# Patient Record
Sex: FEMALE | Race: WHITE | HISPANIC OR LATINO | Employment: OTHER | URBAN - METROPOLITAN AREA
[De-identification: names, ages, dates, MRNs, and addresses within clinical notes are randomized per-mention and may not be internally consistent; named-entity substitution may affect disease eponyms.]

---

## 2023-03-08 LAB
ERYTHROCYTE DISTRIBUTION WIDTH (RATIO) BY AUTOMATED COUNT: 14.1 % (ref 11.5–14.5)
ERYTHROCYTE MEAN CORPUSCULAR HEMOGLOBIN CONCENTRATION (G/DL) BY AUTOMATED: 31.7 G/DL (ref 32–36)
ERYTHROCYTE MEAN CORPUSCULAR VOLUME (FL) BY AUTOMATED COUNT: 89 FL (ref 80–100)
ERYTHROCYTES (10*6/UL) IN BLOOD BY AUTOMATED COUNT: 3.98 X10E12/L (ref 4–5.2)
HEMATOCRIT (%) IN BLOOD BY AUTOMATED COUNT: 35.3 % (ref 36–46)
HEMOGLOBIN (G/DL) IN BLOOD: 11.2 G/DL (ref 12–16)
LEUKOCYTES (10*3/UL) IN BLOOD BY AUTOMATED COUNT: 6.7 X10E9/L (ref 4.4–11.3)
NRBC (PER 100 WBCS) BY AUTOMATED COUNT: 0 /100 WBC (ref 0–0)
PLATELETS (10*3/UL) IN BLOOD AUTOMATED COUNT: 179 X10E9/L (ref 150–450)

## 2023-03-15 LAB
COBALAMIN (VITAMIN B12) (PG/ML) IN SER/PLAS: 507 PG/ML (ref 211–911)
FERRITIN (UG/LL) IN SER/PLAS: 73 UG/L (ref 8–150)
IRON (UG/DL) IN SER/PLAS: 67 UG/DL (ref 35–150)
IRON BINDING CAPACITY (UG/DL) IN SER/PLAS: 304 UG/DL (ref 240–445)
IRON SATURATION (%) IN SER/PLAS: 22 % (ref 25–45)

## 2023-04-07 LAB
ANION GAP IN SER/PLAS: 12 MMOL/L (ref 10–20)
CALCIUM (MG/DL) IN SER/PLAS: 9 MG/DL (ref 8.6–10.6)
CARBON DIOXIDE, TOTAL (MMOL/L) IN SER/PLAS: 27 MMOL/L (ref 21–32)
CHLORIDE (MMOL/L) IN SER/PLAS: 105 MMOL/L (ref 98–107)
CHOLESTEROL IN LDL (MG/DL) IN SER/PLAS BY DIRECT ASSAY: 61 MG/DL (ref 0–129)
CREATININE (MG/DL) IN SER/PLAS: 0.73 MG/DL (ref 0.5–1.05)
ERYTHROCYTE DISTRIBUTION WIDTH (RATIO) BY AUTOMATED COUNT: 14.1 % (ref 11.5–14.5)
ERYTHROCYTE MEAN CORPUSCULAR HEMOGLOBIN CONCENTRATION (G/DL) BY AUTOMATED: 32 G/DL (ref 32–36)
ERYTHROCYTE MEAN CORPUSCULAR VOLUME (FL) BY AUTOMATED COUNT: 88 FL (ref 80–100)
ERYTHROCYTES (10*6/UL) IN BLOOD BY AUTOMATED COUNT: 4.14 X10E12/L (ref 4–5.2)
GFR FEMALE: 82 ML/MIN/1.73M2
GLUCOSE (MG/DL) IN SER/PLAS: 81 MG/DL (ref 74–99)
HEMATOCRIT (%) IN BLOOD BY AUTOMATED COUNT: 36.6 % (ref 36–46)
HEMOGLOBIN (G/DL) IN BLOOD: 11.7 G/DL (ref 12–16)
LEUKOCYTES (10*3/UL) IN BLOOD BY AUTOMATED COUNT: 6.9 X10E9/L (ref 4.4–11.3)
NRBC (PER 100 WBCS) BY AUTOMATED COUNT: 0 /100 WBC (ref 0–0)
PLATELETS (10*3/UL) IN BLOOD AUTOMATED COUNT: 186 X10E9/L (ref 150–450)
POTASSIUM (MMOL/L) IN SER/PLAS: 4.4 MMOL/L (ref 3.5–5.3)
SODIUM (MMOL/L) IN SER/PLAS: 140 MMOL/L (ref 136–145)
UREA NITROGEN (MG/DL) IN SER/PLAS: 22 MG/DL (ref 6–23)

## 2023-06-12 LAB
ANION GAP IN SER/PLAS: 14 MMOL/L (ref 10–20)
CALCIUM (MG/DL) IN SER/PLAS: 9.5 MG/DL (ref 8.6–10.6)
CARBON DIOXIDE, TOTAL (MMOL/L) IN SER/PLAS: 28 MMOL/L (ref 21–32)
CHLORIDE (MMOL/L) IN SER/PLAS: 106 MMOL/L (ref 98–107)
CREATININE (MG/DL) IN SER/PLAS: 0.69 MG/DL (ref 0.5–1.05)
GFR FEMALE: 87 ML/MIN/1.73M2
GLUCOSE (MG/DL) IN SER/PLAS: 88 MG/DL (ref 74–99)
NATRIURETIC PEPTIDE B (PG/ML) IN SER/PLAS: 243 PG/ML (ref 0–99)
POTASSIUM (MMOL/L) IN SER/PLAS: 4.7 MMOL/L (ref 3.5–5.3)
SODIUM (MMOL/L) IN SER/PLAS: 143 MMOL/L (ref 136–145)
UREA NITROGEN (MG/DL) IN SER/PLAS: 23 MG/DL (ref 6–23)

## 2023-09-02 PROBLEM — R09.89 BRUIT OF RIGHT CAROTID ARTERY: Status: ACTIVE | Noted: 2023-09-02

## 2023-09-02 PROBLEM — E78.5 HYPERLIPIDEMIA: Status: ACTIVE | Noted: 2023-09-02

## 2023-09-02 PROBLEM — D64.9 MILD ANEMIA: Status: ACTIVE | Noted: 2023-09-02

## 2023-09-02 PROBLEM — R60.9 EDEMA: Status: ACTIVE | Noted: 2023-09-02

## 2023-09-02 PROBLEM — M54.31 SCIATICA OF RIGHT SIDE: Status: ACTIVE | Noted: 2023-09-02

## 2023-09-02 PROBLEM — R01.1 HEART MURMUR: Status: ACTIVE | Noted: 2023-09-02

## 2023-09-02 PROBLEM — R27.0 ATAXIA: Status: ACTIVE | Noted: 2023-09-02

## 2023-09-02 PROBLEM — L72.3 SEBACEOUS CYST: Status: ACTIVE | Noted: 2023-09-02

## 2023-09-02 PROBLEM — M17.9 OA (OSTEOARTHRITIS) OF KNEE: Status: ACTIVE | Noted: 2023-09-02

## 2023-09-02 PROBLEM — L24.A9 DRAINAGE FROM WOUND: Status: ACTIVE | Noted: 2023-09-02

## 2023-09-02 PROBLEM — Z85.42 HISTORY OF CANCER OF UTERUS: Status: ACTIVE | Noted: 2023-09-02

## 2023-09-02 PROBLEM — J44.9 COPD (CHRONIC OBSTRUCTIVE PULMONARY DISEASE) (MULTI): Status: ACTIVE | Noted: 2023-09-02

## 2023-09-02 PROBLEM — R93.89 ABNORMAL MRI: Status: ACTIVE | Noted: 2023-09-02

## 2023-09-02 PROBLEM — H81.10 BENIGN PAROXYSMAL POSITIONAL VERTIGO: Status: ACTIVE | Noted: 2023-09-02

## 2023-09-02 PROBLEM — I49.9 IRREGULAR HEARTBEAT: Status: ACTIVE | Noted: 2023-09-02

## 2023-09-02 PROBLEM — M20.12 ACQUIRED HALLUX VALGUS OF LEFT FOOT: Status: ACTIVE | Noted: 2023-09-02

## 2023-09-02 PROBLEM — K62.5 RECTAL BLEEDING: Status: ACTIVE | Noted: 2023-09-02

## 2023-09-02 PROBLEM — M20.31 ACQUIRED HALLUX VARUS OF RIGHT FOOT: Status: ACTIVE | Noted: 2023-09-02

## 2023-09-02 PROBLEM — E04.2 NONTOXIC MULTINODULAR GOITER: Status: ACTIVE | Noted: 2023-09-02

## 2023-09-02 PROBLEM — G89.29 CHRONIC BILATERAL LOW BACK PAIN WITHOUT SCIATICA: Status: ACTIVE | Noted: 2023-09-02

## 2023-09-02 PROBLEM — M54.50 CHRONIC BILATERAL LOW BACK PAIN WITHOUT SCIATICA: Status: ACTIVE | Noted: 2023-09-02

## 2023-09-02 PROBLEM — I10 HYPERTENSION: Status: ACTIVE | Noted: 2023-09-02

## 2023-09-02 PROBLEM — R73.01 IFG (IMPAIRED FASTING GLUCOSE): Status: ACTIVE | Noted: 2023-09-02

## 2023-09-02 PROBLEM — L84 CALLUS: Status: ACTIVE | Noted: 2023-09-02

## 2023-09-02 PROBLEM — L02.413 ABSCESS OF RIGHT SHOULDER: Status: ACTIVE | Noted: 2023-09-02

## 2023-09-02 PROBLEM — M79.675 CHRONIC TOE PAIN, LEFT FOOT: Status: ACTIVE | Noted: 2023-09-02

## 2023-09-02 PROBLEM — M48.062 SPINAL STENOSIS OF LUMBAR REGION WITH NEUROGENIC CLAUDICATION: Status: ACTIVE | Noted: 2023-09-02

## 2023-09-02 PROBLEM — E55.9 VITAMIN D DEFICIENCY: Status: ACTIVE | Noted: 2023-09-02

## 2023-09-02 PROBLEM — I10 BENIGN ESSENTIAL HYPERTENSION: Status: ACTIVE | Noted: 2023-09-02

## 2023-09-02 PROBLEM — E66.9 CLASS 1 OBESITY WITH BODY MASS INDEX (BMI) OF 33.0 TO 33.9 IN ADULT: Status: ACTIVE | Noted: 2023-09-02

## 2023-09-02 PROBLEM — M20.5X9 ACQUIRED CLAW TOE: Status: ACTIVE | Noted: 2023-09-02

## 2023-09-02 PROBLEM — E66.811 CLASS 1 OBESITY WITH BODY MASS INDEX (BMI) OF 33.0 TO 33.9 IN ADULT: Status: ACTIVE | Noted: 2023-09-02

## 2023-09-02 PROBLEM — H35.30 AGE-RELATED MACULAR DEGENERATION: Status: ACTIVE | Noted: 2023-09-02

## 2023-09-02 PROBLEM — M20.42 HAMMER TOE OF LEFT FOOT: Status: ACTIVE | Noted: 2023-09-02

## 2023-09-02 PROBLEM — I48.0 PAROXYSMAL ATRIAL FIBRILLATION (MULTI): Status: ACTIVE | Noted: 2023-09-02

## 2023-09-02 PROBLEM — E04.1 SOLITARY THYROID NODULE: Status: ACTIVE | Noted: 2023-09-02

## 2023-09-02 PROBLEM — I50.9 CONGESTIVE HEART FAILURE (CHF) (MULTI): Status: ACTIVE | Noted: 2023-09-02

## 2023-09-02 PROBLEM — G47.33 OBSTRUCTIVE SLEEP APNEA OF ADULT: Status: ACTIVE | Noted: 2023-09-02

## 2023-09-02 PROBLEM — G89.29 CHRONIC TOE PAIN, LEFT FOOT: Status: ACTIVE | Noted: 2023-09-02

## 2023-09-02 RX ORDER — METOPROLOL SUCCINATE 50 MG/1
1 TABLET, EXTENDED RELEASE ORAL DAILY
COMMUNITY

## 2023-09-02 RX ORDER — POTASSIUM CHLORIDE 20 MEQ/1
1 TABLET, EXTENDED RELEASE ORAL DAILY
COMMUNITY
Start: 2022-10-06

## 2023-09-02 RX ORDER — FUROSEMIDE 40 MG/1
1 TABLET ORAL
COMMUNITY
Start: 2018-01-16

## 2023-09-02 RX ORDER — ACETAMINOPHEN 500 MG
TABLET ORAL
COMMUNITY

## 2023-09-02 RX ORDER — ATORVASTATIN CALCIUM 10 MG/1
1 TABLET, FILM COATED ORAL
COMMUNITY
Start: 2018-04-05

## 2023-09-02 RX ORDER — GUAIFENESIN 1200 MG/1
TABLET, EXTENDED RELEASE ORAL
COMMUNITY

## 2023-09-02 RX ORDER — AMLODIPINE BESYLATE 2.5 MG/1
1 TABLET ORAL DAILY
COMMUNITY
Start: 2022-10-06 | End: 2024-02-13 | Stop reason: SINTOL

## 2023-09-02 RX ORDER — BENAZEPRIL HYDROCHLORIDE 40 MG/1
1 TABLET ORAL DAILY
COMMUNITY
Start: 2017-09-15

## 2023-09-02 RX ORDER — HYDRALAZINE HYDROCHLORIDE 25 MG/1
1 TABLET, FILM COATED ORAL 2 TIMES DAILY
COMMUNITY
Start: 2022-10-06 | End: 2024-05-22 | Stop reason: WASHOUT

## 2023-10-10 ENCOUNTER — OFFICE VISIT (OUTPATIENT)
Dept: PRIMARY CARE | Facility: CLINIC | Age: 83
End: 2023-10-10
Payer: MEDICARE

## 2023-10-10 VITALS
TEMPERATURE: 96.6 F | OXYGEN SATURATION: 96 % | BODY MASS INDEX: 33.16 KG/M2 | SYSTOLIC BLOOD PRESSURE: 140 MMHG | WEIGHT: 164.46 LBS | HEART RATE: 60 BPM | HEIGHT: 59 IN | RESPIRATION RATE: 16 BRPM | DIASTOLIC BLOOD PRESSURE: 70 MMHG

## 2023-10-10 DIAGNOSIS — I48.0 PAROXYSMAL ATRIAL FIBRILLATION (MULTI): ICD-10-CM

## 2023-10-10 DIAGNOSIS — I10 PRIMARY HYPERTENSION: ICD-10-CM

## 2023-10-10 DIAGNOSIS — R60.0 LOCALIZED EDEMA: Primary | ICD-10-CM

## 2023-10-10 PROCEDURE — 90662 IIV NO PRSV INCREASED AG IM: CPT | Performed by: INTERNAL MEDICINE

## 2023-10-10 PROCEDURE — G0008 ADMIN INFLUENZA VIRUS VAC: HCPCS | Performed by: INTERNAL MEDICINE

## 2023-10-10 PROCEDURE — 3077F SYST BP >= 140 MM HG: CPT | Performed by: INTERNAL MEDICINE

## 2023-10-10 PROCEDURE — 1036F TOBACCO NON-USER: CPT | Performed by: INTERNAL MEDICINE

## 2023-10-10 PROCEDURE — 3078F DIAST BP <80 MM HG: CPT | Performed by: INTERNAL MEDICINE

## 2023-10-10 PROCEDURE — 1159F MED LIST DOCD IN RCRD: CPT | Performed by: INTERNAL MEDICINE

## 2023-10-10 PROCEDURE — 99214 OFFICE O/P EST MOD 30 MIN: CPT | Performed by: INTERNAL MEDICINE

## 2023-10-10 PROCEDURE — 1170F FXNL STATUS ASSESSED: CPT | Performed by: INTERNAL MEDICINE

## 2023-10-10 ASSESSMENT — ENCOUNTER SYMPTOMS
DEPRESSION: 0
LOSS OF SENSATION IN FEET: 0
OCCASIONAL FEELINGS OF UNSTEADINESS: 0

## 2023-10-10 ASSESSMENT — PATIENT HEALTH QUESTIONNAIRE - PHQ9
2. FEELING DOWN, DEPRESSED OR HOPELESS: NOT AT ALL
SUM OF ALL RESPONSES TO PHQ9 QUESTIONS 1 AND 2: 0
1. LITTLE INTEREST OR PLEASURE IN DOING THINGS: NOT AT ALL

## 2023-10-10 NOTE — PROGRESS NOTES
"Subjective   Patient ID: Jessica Leija is a 83 y.o. female who presents for Hypertension and Heart Problem.    HPI 84 yo w f for follow up      Review of Systems  CVS 3 episodes of afib called card med changes hydralazine adjusted  Resp seeing sleep med at [facility]    Objective   /70 (BP Location: Right arm, Patient Position: Sitting)   Pulse 60   Temp 35.9 °C (96.6 °F)   Resp 16   Ht 1.499 m (4' 11\")   Wt 74.6 kg (164 lb 7.4 oz)   SpO2 96%   BMI 33.22 kg/m²     Physical Exam    Lungs clear to auscultation  Heart regular rate and rhythm without gallop rub or murmur  Extremities 1+ edema    Assessment/Plan   Hypertension good control #2 A-fib currently in sinus rhythm with rate controlled anticoagulation #3 sleep apnea seeing sleep medicine #4 immunization high-dose flu shot today COVID booster recommended RSV recommended follow-up with me in February           "

## 2023-12-21 ASSESSMENT — ACTIVITIES OF DAILY LIVING (ADL)
BATHING: INDEPENDENT
TAKING_MEDICATION: INDEPENDENT
DOING_HOUSEWORK: INDEPENDENT
MANAGING_FINANCES: INDEPENDENT
DRESSING: INDEPENDENT
GROCERY_SHOPPING: INDEPENDENT

## 2023-12-21 ASSESSMENT — PATIENT HEALTH QUESTIONNAIRE - PHQ9
SUM OF ALL RESPONSES TO PHQ9 QUESTIONS 1 AND 2: 0
2. FEELING DOWN, DEPRESSED OR HOPELESS: NOT AT ALL
1. LITTLE INTEREST OR PLEASURE IN DOING THINGS: NOT AT ALL

## 2024-02-13 ENCOUNTER — OFFICE VISIT (OUTPATIENT)
Dept: PRIMARY CARE | Facility: CLINIC | Age: 84
End: 2024-02-13
Payer: MEDICARE

## 2024-02-13 VITALS
RESPIRATION RATE: 17 BRPM | WEIGHT: 158.5 LBS | DIASTOLIC BLOOD PRESSURE: 70 MMHG | BODY MASS INDEX: 31.95 KG/M2 | SYSTOLIC BLOOD PRESSURE: 140 MMHG | OXYGEN SATURATION: 98 % | HEART RATE: 70 BPM | HEIGHT: 59 IN | TEMPERATURE: 97 F

## 2024-02-13 DIAGNOSIS — E66.09 CLASS 1 OBESITY DUE TO EXCESS CALORIES WITH SERIOUS COMORBIDITY AND BODY MASS INDEX (BMI) OF 33.0 TO 33.9 IN ADULT: ICD-10-CM

## 2024-02-13 DIAGNOSIS — I48.0 PAROXYSMAL ATRIAL FIBRILLATION (MULTI): ICD-10-CM

## 2024-02-13 DIAGNOSIS — G47.33 OBSTRUCTIVE SLEEP APNEA OF ADULT: ICD-10-CM

## 2024-02-13 DIAGNOSIS — I27.20 PULMONARY HYPERTENSION (MULTI): ICD-10-CM

## 2024-02-13 DIAGNOSIS — I10 PRIMARY HYPERTENSION: ICD-10-CM

## 2024-02-13 DIAGNOSIS — R60.0 LOCALIZED EDEMA: Primary | ICD-10-CM

## 2024-02-13 DIAGNOSIS — I34.0 MITRAL VALVE INSUFFICIENCY, UNSPECIFIED ETIOLOGY: ICD-10-CM

## 2024-02-13 PROCEDURE — 1170F FXNL STATUS ASSESSED: CPT | Performed by: INTERNAL MEDICINE

## 2024-02-13 PROCEDURE — 3077F SYST BP >= 140 MM HG: CPT | Performed by: INTERNAL MEDICINE

## 2024-02-13 PROCEDURE — 1036F TOBACCO NON-USER: CPT | Performed by: INTERNAL MEDICINE

## 2024-02-13 PROCEDURE — 3078F DIAST BP <80 MM HG: CPT | Performed by: INTERNAL MEDICINE

## 2024-02-13 PROCEDURE — 1160F RVW MEDS BY RX/DR IN RCRD: CPT | Performed by: INTERNAL MEDICINE

## 2024-02-13 PROCEDURE — 1159F MED LIST DOCD IN RCRD: CPT | Performed by: INTERNAL MEDICINE

## 2024-02-13 PROCEDURE — 99214 OFFICE O/P EST MOD 30 MIN: CPT | Performed by: INTERNAL MEDICINE

## 2024-02-13 PROCEDURE — 1124F ACP DISCUSS-NO DSCNMKR DOCD: CPT | Performed by: INTERNAL MEDICINE

## 2024-02-13 PROCEDURE — G0439 PPPS, SUBSEQ VISIT: HCPCS | Performed by: INTERNAL MEDICINE

## 2024-02-13 ASSESSMENT — ENCOUNTER SYMPTOMS
ARTHRALGIAS: 1
PALPITATIONS: 0
SHORTNESS OF BREATH: 0
ACTIVITY CHANGE: 0
DYSPHORIC MOOD: 0
GASTROINTESTINAL NEGATIVE: 1

## 2024-02-13 ASSESSMENT — ACTIVITIES OF DAILY LIVING (ADL)
BATHING: INDEPENDENT
MANAGING_FINANCES: INDEPENDENT
DOING_HOUSEWORK: INDEPENDENT
GROCERY_SHOPPING: INDEPENDENT
DRESSING: INDEPENDENT
TAKING_MEDICATION: INDEPENDENT

## 2024-02-13 ASSESSMENT — PATIENT HEALTH QUESTIONNAIRE - PHQ9
1. LITTLE INTEREST OR PLEASURE IN DOING THINGS: NOT AT ALL
2. FEELING DOWN, DEPRESSED OR HOPELESS: NOT AT ALL
SUM OF ALL RESPONSES TO PHQ9 QUESTIONS 1 AND 2: 0

## 2024-02-13 NOTE — PROGRESS NOTES
"Subjective   Reason for Visit: Jessica Leija is an 83 y.o. female here for a Medicare Wellness visit.     Past Medical, Surgical, and Family History reviewed and updated in chart.    Reviewed all medications by prescribing practitioner or clinical pharmacist (such as prescriptions, OTCs, herbal therapies and supplements) and documented in the medical record.    HPI  83-year-old female here for follow-up and Medicare wellness visit    Patient remains active she lives alone and cares for herself    Patient Care Team:  Cory Vasquez MD as PCP - General (Internal Medicine)  Cory Vasquez MD [facility] PCP - [facility]ributed [facility]     Review of Systems   Constitutional:  Negative for activity change.   HENT: Negative.     Eyes:  Negative for visual disturbance.   Respiratory:  Negative for shortness of breath.    Cardiovascular:  Positive for leg swelling. Negative for chest pain and palpitations.   Gastrointestinal: Negative.    Musculoskeletal:  Positive for arthralgias.   Psychiatric/Behavioral:  Negative for dysphoric mood.        Objective   Vitals:  /70 (BP Location: Right arm, Patient Position: Sitting)   Pulse 70   Temp 36.1 °C (97 °F)   Resp 17   Ht 1.499 m (4' 11\")   Wt 71.9 kg (158 lb 8 oz)   SpO2 98%   BMI 32.01 kg/m²       Physical Exam  Constitutional:       Appearance: She is obese.   Cardiovascular:      Rate and Rhythm: Regular rhythm.      Heart sounds: No murmur heard.  Pulmonary:      Breath sounds: Normal breath sounds.   Neurological:      Mental Status: She is alert. Mental status is at baseline.   Psychiatric:         Mood and Affect: Mood normal.         Assessment/Plan   Diagnoses and all orders for this visit:  Localized edema  Stable  Primary hypertension  Good control  Paroxysmal atrial fibrillation (CMS/HCC)  On anticoagulation followed by cardiology  Class 1 obesity due to excess calories with serious comorbidity and body mass index (BMI) of 33.0 to 33.9 in " adult  Patient's ambulation is diminished  Obstructive sleep apnea of adult  On new treatment a couple months this may be causing her pulmonary hypertension  Pulmonary hypertension (CMS/HCC)  Mitral valve insufficiency, unspecified etiology discussed findings on echo  Patient will have blood work CBC CMP lipid TSH ordered by cardiology follow-up with me 3 months

## 2024-02-19 ENCOUNTER — LAB (OUTPATIENT)
Dept: LAB | Facility: LAB | Age: 84
End: 2024-02-19
Payer: MEDICARE

## 2024-02-19 DIAGNOSIS — E78.00 PURE HYPERCHOLESTEROLEMIA, UNSPECIFIED: Primary | ICD-10-CM

## 2024-02-19 LAB
ALBUMIN SERPL-MCNC: 3.8 G/DL (ref 3.5–5)
ALP BLD-CCNC: 111 U/L (ref 35–125)
ALT SERPL-CCNC: 16 U/L (ref 5–40)
ANION GAP SERPL CALC-SCNC: 9 MMOL/L
AST SERPL-CCNC: 23 U/L (ref 5–40)
BILIRUB SERPL-MCNC: 0.6 MG/DL (ref 0.1–1.2)
BUN SERPL-MCNC: 24 MG/DL (ref 8–25)
CALCIUM SERPL-MCNC: 9.1 MG/DL (ref 8.5–10.4)
CHLORIDE SERPL-SCNC: 108 MMOL/L (ref 97–107)
CHOLEST SERPL-MCNC: 126 MG/DL (ref 133–200)
CHOLEST/HDLC SERPL: 2.1 {RATIO}
CO2 SERPL-SCNC: 25 MMOL/L (ref 24–31)
CREAT SERPL-MCNC: 0.8 MG/DL (ref 0.4–1.6)
EGFRCR SERPLBLD CKD-EPI 2021: 74 ML/MIN/1.73M*2
ERYTHROCYTE [DISTWIDTH] IN BLOOD BY AUTOMATED COUNT: 13.8 % (ref 11.5–14.5)
GLUCOSE SERPL-MCNC: 91 MG/DL (ref 65–99)
HCT VFR BLD AUTO: 36.6 % (ref 36–46)
HDLC SERPL-MCNC: 60 MG/DL
HGB BLD-MCNC: 11.8 G/DL (ref 12–16)
LDLC SERPL CALC-MCNC: 54 MG/DL (ref 65–130)
MCH RBC QN AUTO: 28.8 PG (ref 26–34)
MCHC RBC AUTO-ENTMCNC: 32.2 G/DL (ref 32–36)
MCV RBC AUTO: 89 FL (ref 80–100)
NRBC BLD-RTO: 0 /100 WBCS (ref 0–0)
PLATELET # BLD AUTO: 183 X10*3/UL (ref 150–450)
POTASSIUM SERPL-SCNC: 4.6 MMOL/L (ref 3.4–5.1)
PROT SERPL-MCNC: 6.4 G/DL (ref 5.9–7.9)
RBC # BLD AUTO: 4.1 X10*6/UL (ref 4–5.2)
SODIUM SERPL-SCNC: 142 MMOL/L (ref 133–145)
TRIGL SERPL-MCNC: 59 MG/DL (ref 40–150)
TSH SERPL DL<=0.05 MIU/L-ACNC: 1.7 MIU/L (ref 0.27–4.2)
WBC # BLD AUTO: 7.5 X10*3/UL (ref 4.4–11.3)

## 2024-02-19 PROCEDURE — 84443 ASSAY THYROID STIM HORMONE: CPT

## 2024-02-19 PROCEDURE — 80053 COMPREHEN METABOLIC PANEL: CPT

## 2024-02-19 PROCEDURE — 85027 COMPLETE CBC AUTOMATED: CPT

## 2024-02-19 PROCEDURE — 80061 LIPID PANEL: CPT

## 2024-05-14 ENCOUNTER — APPOINTMENT (OUTPATIENT)
Dept: PRIMARY CARE | Facility: CLINIC | Age: 84
End: 2024-05-14
Payer: MEDICARE

## 2024-05-22 ENCOUNTER — TELEPHONE (OUTPATIENT)
Dept: PRIMARY CARE | Facility: CLINIC | Age: 84
End: 2024-05-22

## 2024-05-22 ENCOUNTER — OFFICE VISIT (OUTPATIENT)
Dept: PRIMARY CARE | Facility: CLINIC | Age: 84
End: 2024-05-22
Payer: MEDICARE

## 2024-05-22 VITALS
HEART RATE: 66 BPM | BODY MASS INDEX: 31.85 KG/M2 | SYSTOLIC BLOOD PRESSURE: 113 MMHG | OXYGEN SATURATION: 95 % | HEIGHT: 59 IN | RESPIRATION RATE: 18 BRPM | DIASTOLIC BLOOD PRESSURE: 49 MMHG | WEIGHT: 158 LBS

## 2024-05-22 DIAGNOSIS — E78.49 OTHER HYPERLIPIDEMIA: ICD-10-CM

## 2024-05-22 DIAGNOSIS — I48.0 PAROXYSMAL ATRIAL FIBRILLATION (MULTI): ICD-10-CM

## 2024-05-22 DIAGNOSIS — G47.30 SLEEP APNEA, UNSPECIFIED TYPE: ICD-10-CM

## 2024-05-22 DIAGNOSIS — R26.9 GAIT ABNORMALITY: Primary | ICD-10-CM

## 2024-05-22 DIAGNOSIS — R09.89 BRUIT OF RIGHT CAROTID ARTERY: ICD-10-CM

## 2024-05-22 DIAGNOSIS — I10 BENIGN ESSENTIAL HYPERTENSION: ICD-10-CM

## 2024-05-22 DIAGNOSIS — R01.1 HEART MURMUR: ICD-10-CM

## 2024-05-22 DIAGNOSIS — H35.30 AGE-RELATED MACULAR DEGENERATION: ICD-10-CM

## 2024-05-22 DIAGNOSIS — C80.1 CANCER (MULTI): ICD-10-CM

## 2024-05-22 DIAGNOSIS — Z85.42 HISTORY OF CANCER OF UTERUS: ICD-10-CM

## 2024-05-22 PROBLEM — Z86.79 HISTORY OF HYPERTENSION: Status: ACTIVE | Noted: 2024-05-22

## 2024-05-22 PROBLEM — H04.123 DRY EYES: Status: ACTIVE | Noted: 2017-08-30

## 2024-05-22 PROBLEM — H25.12 NUCLEAR SENILE CATARACT OF LEFT EYE: Status: ACTIVE | Noted: 2021-10-12

## 2024-05-22 PROBLEM — Z86.39 HISTORY OF ELEVATED LIPIDS: Status: ACTIVE | Noted: 2024-05-22

## 2024-05-22 PROBLEM — J06.9 VIRAL UPPER RESPIRATORY TRACT INFECTION: Status: ACTIVE | Noted: 2024-05-22

## 2024-05-22 PROBLEM — R93.89 ABNORMAL THYROID ULTRASOUND: Status: ACTIVE | Noted: 2018-11-08

## 2024-05-22 PROBLEM — J44.9 COPD (CHRONIC OBSTRUCTIVE PULMONARY DISEASE) (MULTI): Status: RESOLVED | Noted: 2023-09-02 | Resolved: 2024-05-22

## 2024-05-22 PROBLEM — H35.363 DEGENERATIVE RETINAL DRUSEN OF BOTH EYES: Status: ACTIVE | Noted: 2017-08-30

## 2024-05-22 PROBLEM — H25.13 AGE-RELATED NUCLEAR CATARACT OF BOTH EYES: Status: ACTIVE | Noted: 2017-08-30

## 2024-05-22 PROBLEM — H02.403 PTOSIS OF BOTH EYELIDS: Status: ACTIVE | Noted: 2017-08-30

## 2024-05-22 PROBLEM — I27.20 PULMONARY HYPERTENSION (MULTI): Status: ACTIVE | Noted: 2024-05-22

## 2024-05-22 PROCEDURE — 1158F ADVNC CARE PLAN TLK DOCD: CPT | Performed by: INTERNAL MEDICINE

## 2024-05-22 PROCEDURE — 1160F RVW MEDS BY RX/DR IN RCRD: CPT | Performed by: INTERNAL MEDICINE

## 2024-05-22 PROCEDURE — 1036F TOBACCO NON-USER: CPT | Performed by: INTERNAL MEDICINE

## 2024-05-22 PROCEDURE — 99215 OFFICE O/P EST HI 40 MIN: CPT | Performed by: INTERNAL MEDICINE

## 2024-05-22 PROCEDURE — 3074F SYST BP LT 130 MM HG: CPT | Performed by: INTERNAL MEDICINE

## 2024-05-22 PROCEDURE — 1159F MED LIST DOCD IN RCRD: CPT | Performed by: INTERNAL MEDICINE

## 2024-05-22 PROCEDURE — 1123F ACP DISCUSS/DSCN MKR DOCD: CPT | Performed by: INTERNAL MEDICINE

## 2024-05-22 PROCEDURE — 3078F DIAST BP <80 MM HG: CPT | Performed by: INTERNAL MEDICINE

## 2024-05-22 RX ORDER — HYDRALAZINE HYDROCHLORIDE 50 MG/1
75 TABLET, FILM COATED ORAL 2 TIMES DAILY
COMMUNITY
End: 2024-05-22 | Stop reason: ENTERED-IN-ERROR

## 2024-05-22 RX ORDER — MULTIVIT-MIN/FA/LYCOPEN/LUTEIN .4-300-25
1 TABLET ORAL DAILY
COMMUNITY

## 2024-05-22 RX ORDER — HYDRALAZINE HYDROCHLORIDE 25 MG/1
1.5 TABLET, FILM COATED ORAL 2 TIMES DAILY
COMMUNITY

## 2024-05-22 ASSESSMENT — ENCOUNTER SYMPTOMS
OCCASIONAL FEELINGS OF UNSTEADINESS: 0
LOSS OF SENSATION IN FEET: 0
DEPRESSION: 1

## 2024-05-22 NOTE — PROGRESS NOTES
"Subjective   Patient ID: Jessica Leija is a 83 y.o. female who presents for Follow-up (3 month).    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   Told she needs to keep her blood pressure under good control her cardiologist increased hydralazine from 25 twice daily to 75 twice daily  Musculoskeletal more difficulty walking his pain in her calfs not necessarily low back pain is bilateral no numbness possibly some mild weakness.  No other stroke symptoms such as headache blurred vision slurred speech    Objective   BP (!) 113/49 (BP Location: Left arm, Patient Position: Sitting)   Pulse 66   Resp 18   Ht 1.499 m (4' 11\")   Wt 71.7 kg (158 lb)   SpO2 95%   BMI 31.91 kg/m²     Physical Exam  My blood pressure reading was 120/60 right arm medium cuff  Lungs clear  Heart regular rate and rhythm 1/6 murmur right upper sternal border  Carotids 1+ without bruits  Abdomen negative  Musculoskeletal strength testing lower extremities is equal straight leg raises good foot plantar and dorsiflexion good no calf tenderness hips demonstrate good range of motion knees crepitation some pain more in the right  Gait she has a cane mild instability otherwise no other focal neurodeficit    Assessment/Plan   Diagnoses and all orders for this visit:  Gait abnormality multifactorial possible spinal canal stenosis arthritis of the knees deconditioning.  Patient prefers no MRIs or aggressive type workup at this point I think physical therapy will help with some level try for couple months and follow-up  -     Referral to Physical Therapy; Future  Cancer (Multi)  Stable  Benign essential hypertension  Good control on new treatment  Other hyperlipidemia  Per cardiology  Paroxysmal atrial " fibrillation (Multi)  Per cardiology  Bruit of right carotid artery  Stable  Heart murmur  Stable  Age-related macular degeneration  You are having aggressive treatment through [facility]  History of cancer of uterus  Stable  Sleep apnea, unspecified type  Continue with CPAP

## 2024-06-12 ENCOUNTER — EVALUATION (OUTPATIENT)
Dept: PHYSICAL THERAPY | Facility: CLINIC | Age: 84
End: 2024-06-12
Payer: MEDICARE

## 2024-06-12 DIAGNOSIS — R26.9 GAIT ABNORMALITY: ICD-10-CM

## 2024-06-12 PROCEDURE — 97530 THERAPEUTIC ACTIVITIES: CPT | Mod: GP | Performed by: PHYSICAL THERAPIST

## 2024-06-12 PROCEDURE — 97110 THERAPEUTIC EXERCISES: CPT | Mod: GP | Performed by: PHYSICAL THERAPIST

## 2024-06-12 PROCEDURE — 97162 PT EVAL MOD COMPLEX 30 MIN: CPT | Mod: GP | Performed by: PHYSICAL THERAPIST

## 2024-06-12 ASSESSMENT — ENCOUNTER SYMPTOMS
OCCASIONAL FEELINGS OF UNSTEADINESS: 1
DEPRESSION: 0
LOSS OF SENSATION IN FEET: 0

## 2024-06-12 NOTE — PROGRESS NOTES
"Physical Therapy Evaluation and Treatment      Patient Name: Jessica Leija  MRN: [id number]  Today's Date: 2024  Time Calculation  Start Time: 1059  Stop Time: 1140  Time Calculation (min): 41 min  PT Therapeutic Procedures Time Entry  Therapeutic Exercise Time Entry: 15  Therapeutic Activity Time Entry: 10      Insurance:  Visit number:  of 6  Authorization info: [place]   Insurance Type: Payor: MEDICARE / Plan: MEDICARE PART A AND B / Product Type: *No Product type* /     Current Problem:   1. Gait abnormality  Referral to Physical Therapy    Follow Up In Physical Therapy          Subjective    General:  Pt states she is getting aching pain down the front of both her legs. This starts along the bottom of her knees and extends down to her feet. Thinks weather is a culprit. Having a hard time standing and walking for any length of time. Difficult time even walking up and down her driveway. Has recently gotten worse. Started using cane recently as a result. Sees podiatrist for feet. Goal is improve her walking.       Precautions: None  Pain: \"aching, not pain\" 10       Objective   ROM   Bilateral hip and knee is WNL , slight crepitus with left knee    MMT   Bilat LE strength:  Hip flex 4/5   Knee flex 4/5  Knee ext 4/5    Palpation   TTP bilateral tibialis anterior  TTP bilateral gastroc/soleus     Mild edema in bilateral ankles/feet    Flexibility   Moderate tightness in bilateral gastroc/soleus    Transfers   Bilateral UE support for sit to stand    Gait   Ambulates with decreased sixto using standard cane. Unsteadiness and narrow MIRTHA. Decreased heel to toe, bilaterally.    Stairs   Ascends and descends stairs with step-to-pattern using rails   (Difficult time going up/down curb)    Balance   ROMBER seconds   Semi tandem L over R: 28 seconds  Semi tandem R over L: 21 seconds     Outcome Measures:  ABC Questionnaire: 910/16 = 57%    Treatments:  Therapeutic Exercise: Access Code [id number]  LAQ x10 ea " R/L  Stand hamstring curl with support x10 ea R/L  Heel raises with support x10  Mini squat with support x10     Therapeutic activity:  Educated pt on eval findings and POC  Educated pt on importance of safety with all activities, use cane at all times. Can use peddler     Assessment   Assessment:   Pt is a 84 y.o. female with difficulty walking and generalized LE weakness. Pt with gait deficits, impaired balance, transfer difficulty, reduced strength, and flexibility limits. Pt will benefit from skilled PT to address the above deficits for improvement in functional activities.      Moderate complexity due to patient's clinical presentation being evolving with changing characteristics, with comorbidities/complexities to include heart disease, HTN, cancer, OA, all of which may negatively impact rehab tolerance and progression.     Plan:   Treatment/Interventions: Education/ Instruction, Gait training, Manual therapy, Neuromuscular re-education, Self care/ home management, Therapeutic activities, Therapeutic exercises  PT Plan: Skilled PT  PT Frequency: 1 time per week  Duration: 5 more  Number of Treatments Authorized: [place]  Rehab Potential: Good  Plan of Care Agreement: Patient      Goals:   Active       Mobility       Goal 1       Start:  06/12/24    Expected End:  09/10/24       Pt will improve bilat LE strength to 5/5 to improve I/ADLs.         Goal 2       Start:  06/12/24    Expected End:  09/10/24       Pt will perform ROMBERG for 60 seconds on uneven surface to improve balance and reduce risk of falls.            Pain       Goal 1       Start:  06/12/24    Expected End:  09/10/24       Pt will perform all housework with 0/10 pain and no LOB         Goal 2       Start:  06/12/24    Expected End:  09/10/24       Pt will stand/walk >20 min with 0/10 pain and no LOB to improve I/ADLs.

## 2024-06-20 ENCOUNTER — APPOINTMENT (OUTPATIENT)
Dept: PHYSICAL THERAPY | Facility: CLINIC | Age: 84
End: 2024-06-20
Payer: MEDICARE

## 2024-06-24 ENCOUNTER — APPOINTMENT (OUTPATIENT)
Dept: PHYSICAL THERAPY | Facility: CLINIC | Age: 84
End: 2024-06-24
Payer: MEDICARE

## 2024-06-25 ENCOUNTER — TREATMENT (OUTPATIENT)
Dept: PHYSICAL THERAPY | Facility: CLINIC | Age: 84
End: 2024-06-25
Payer: MEDICARE

## 2024-06-25 DIAGNOSIS — R26.9 GAIT ABNORMALITY: ICD-10-CM

## 2024-06-25 PROCEDURE — 97110 THERAPEUTIC EXERCISES: CPT | Mod: GP,CQ

## 2024-06-25 PROCEDURE — 97116 GAIT TRAINING THERAPY: CPT | Mod: GP,CQ

## 2024-06-25 ASSESSMENT — PAIN SCALES - GENERAL: PAINLEVEL_OUTOF10: 3

## 2024-06-25 ASSESSMENT — PAIN DESCRIPTION - DESCRIPTORS: DESCRIPTORS: ACHING

## 2024-06-25 NOTE — PROGRESS NOTES
"Physical Therapy Treatment    Patient Name: Jessica Leija  MRN: [id number]  Today's Date: 6/25/2024  Time Calculation  Start Time: 1147  Stop Time: 1232  Time Calculation (min): 45 min  PT Therapeutic Procedures Time Entry  Therapeutic Exercise Time Entry: 35  Gait Training Time Entry: 8    Insurance:  Visit number: 2 of 6  Authorization info: Medicare A/B - NO AUTH / [place] / $0 USED 2024 PT/ST / Supp: Geovanny - pending call // ds 6/11/24  Insurance Type: Payor: MEDICARE / Plan: MEDICARE PART A AND B / Product Type: *No Product type* /     Current Problem   1. Gait abnormality  Follow Up In Physical Therapy          Subjective   Pt reports B LE pain on arrival.    General   Reason for Referral: Gait abnormality  Referred By: Cory Vasquez MD  Precautions:  Precautions  Precautions Comment: Fall risk  Pain   0-10 (Numeric) Pain Score: 3  Pain Type: Chronic pain  Pain Location: Leg  Pain Orientation: Right, Left  Pain Radiating Towards: R knee  Pain Descriptors: Aching  Post Treatment Pain Level 1-2/10    Objective   Slow sixto  and short step length during AMB, no LOBs.    Treatments:  Therapeutic Exercise:  Therapeutic Exercise  Therapeutic Exercise Performed: Yes  Therapeutic Exercise Activity 1: Nustep L4, 6'  Therapeutic Exercise Activity 2: Minisquats 2x10  Therapeutic Exercise Activity 3: LAQs 2# 2x10 L/R each  Therapeutic Exercise Activity 4: Hamstring curls  L/R each  Therapeutic Exercise Activity 5: Hip adduction 5\" hold 2x10  Therapeutic Exercise Activity 6: Hip abduction 5\" hold 2x10  Therapeutic Exercise Activity 7: Seated PF PTB 2x10 L/R each    Gait:  Ambulation/Gait Training  Ambulation/Gait Training Performed: Yes  Ambulation/Gait Training 1  Surface 1: Level tile  Device 1: Single point cane  Assistance 1: Distant supervision  Quality of Gait 1: Narrow base of support, Decreased step length, Diminished heel strike  Comments/Distance (ft) 1: Pt AMB with sigle point cane 75'x2 and " multpiple short distances between exercise activites with short step length B, slow sixto, distant supervision.       Assessment   Assessment:   PT Assessment  PT Assessment Results: Decreased strength, Decreased endurance, Impaired balance, Decreased mobility, Decreased safety awareness, Pain  Rehab Prognosis: Good  Evaluation/Treatment Tolerance: Patient tolerated treatment well  Assessment Comment: Pt tolerates thert ex progressions with reduced pain S/S.    Plan:   OP PT Plan  Treatment/Interventions: Education/ Instruction, Gait training, Manual therapy, Neuromuscular re-education, Self care/ home management, Therapeutic activities, Therapeutic exercises  PT Plan: Skilled PT  PT Frequency: 1 time per week  Duration: 5 more  Number of Treatments Authorized: [place]  Rehab Potential: Good  Plan of Care Agreement: Patient    OP EDUCATION:  Outpatient Education  Individual(s) Educated: Patient  Education Provided: Home Exercise Program, Body Mechanics  Patient/Caregiver Demonstrated Understanding: yes  Patient Response to Education: Patient/Caregiver Verbalized Understanding of Information, Patient/Caregiver Performed Return Demonstration of Exercises/Activities, Patient/Caregiver Asked Appropriate Questions    Goals:   Active       Mobility       Goal 1       Start:  06/12/24    Expected End:  09/10/24       Pt will improve bilat LE strength to 5/5 to improve I/ADLs.         Goal 2       Start:  06/12/24    Expected End:  09/10/24       Pt will perform ROMBERG for 60 seconds on uneven surface to improve balance and reduce risk of falls.            Pain       Goal 1       Start:  06/12/24    Expected End:  09/10/24       Pt will perform all housework with 0/10 pain and no LOB         Goal 2       Start:  06/12/24    Expected End:  09/10/24       Pt will stand/walk >20 min with 0/10 pain and no LOB to improve I/ADLs.

## 2024-07-01 ENCOUNTER — TREATMENT (OUTPATIENT)
Dept: PHYSICAL THERAPY | Facility: CLINIC | Age: 84
End: 2024-07-01
Payer: MEDICARE

## 2024-07-01 DIAGNOSIS — R26.9 GAIT ABNORMALITY: ICD-10-CM

## 2024-07-01 PROCEDURE — 97110 THERAPEUTIC EXERCISES: CPT | Mod: GP,CQ

## 2024-07-01 PROCEDURE — 97116 GAIT TRAINING THERAPY: CPT | Mod: GP,CQ

## 2024-07-01 ASSESSMENT — PAIN SCALES - GENERAL: PAINLEVEL_OUTOF10: 2

## 2024-07-01 ASSESSMENT — PAIN DESCRIPTION - DESCRIPTORS: DESCRIPTORS: ACHING

## 2024-07-01 NOTE — PROGRESS NOTES
"Physical Therapy Treatment    Patient Name: Jessica Leija  MRN: [id number]  Today's Date: 7/1/2024  Time Calculation  Start Time: 0935  Stop Time: 1018  Time Calculation (min): 43 min  PT Therapeutic Procedures Time Entry  Therapeutic Exercise Time Entry: 35  Gait Training Time Entry: 8    Insurance:  Visit number: 3 of 6  Authorization info: Medicare A/B - NO AUTH / [place] / $0 USED 2024 PT/ST / Supp: Geovanny - pending call // ds 6/11/24  Insurance Type: Payor: MEDICARE / Plan: MEDICARE PART A AND B / Product Type: *No Product type* /     Current Problem   1. Gait abnormality  Follow Up In Physical Therapy          Subjective   Pt reports attempts of standing heel raises and standing ham curls are painful during HEP    General   Reason for Referral: Gait abnormality  Referred By: Cory Vasquez MD  Precautions:  Precautions  Precautions Comment: Fall risk  Pain   0-10 (Numeric) Pain Score: 2  Pain Type: Chronic pain  Pain Location: Knee  Pain Orientation: Right  Pain Descriptors: Aching  Post Treatment Pain Level 1/10    Objective   Pt performs 1/4 squats with mild transient increaswes in LE S/S.    Treatments:  Therapeutic Exercise:  Therapeutic Exercise  Therapeutic Exercise Performed: Yes  Therapeutic Exercise Activity 1: Nustep L4, 6'  Therapeutic Exercise Activity 2: Minisquats 2x10  Therapeutic Exercise Activity 3: LAQs 2# 2x10 L/R each  Therapeutic Exercise Activity 4: Hamstring curls  L/R each  Therapeutic Exercise Activity 5: Hip adduction 5\" hold 2x10  Therapeutic Exercise Activity 6: Hip abduction 5\" hold 2x10  Therapeutic Exercise Activity 7: Seated PF PTB 2x10 L/R each     Gait:Pt AMB with single point cane 75'x2 and multpile short distances between exercise activites with short step length B, slow sixto, distant supervision.     Assessment   Assessment:   PT Assessment  PT Assessment Results: Decreased strength, Decreased endurance, Impaired balance, Decreased mobility, Decreased safety " awareness, Pain  Rehab Prognosis: Good  Evaluation/Treatment Tolerance: Patient tolerated treatment well  Assessment Comment: Pt [id number] ther ex activites with transient L knee irritation, decreased S/S overall aftewr treatment.    Plan:   OP PT Plan  Treatment/Interventions: Education/ Instruction, Gait training, Manual therapy, Neuromuscular re-education, Self care/ home management, Therapeutic activities, Therapeutic exercises  PT Plan: Skilled PT  PT Frequency: 1 time per week  Duration: 5 more  Number of Treatments Authorized: [place]  Rehab Potential: Good  Plan of Care Agreement: Patient    OP EDUCATION:  Outpatient Education  Individual(s) Educated: Patient  Education Provided: Body Mechanics, Home Exercise Program  Patient/Caregiver Demonstrated Understanding: yes  Patient Response to Education: Patient/Caregiver Verbalized Understanding of Information, Patient/Caregiver Performed Return Demonstration of Exercises/Activities, Patient/Caregiver Asked Appropriate Questions    Goals:   Active       Mobility       Goal 1       Start:  06/12/24    Expected End:  09/10/24       Pt will improve bilat LE strength to 5/5 to improve I/ADLs.         Goal 2       Start:  06/12/24    Expected End:  09/10/24       Pt will perform ROMBERG for 60 seconds on uneven surface to improve balance and reduce risk of falls.            Pain       Goal 1       Start:  06/12/24    Expected End:  09/10/24       Pt will perform all housework with 0/10 pain and no LOB         Goal 2       Start:  06/12/24    Expected End:  09/10/24       Pt will stand/walk >20 min with 0/10 pain and no LOB to improve I/ADLs.

## 2024-07-08 ENCOUNTER — TREATMENT (OUTPATIENT)
Dept: PHYSICAL THERAPY | Facility: CLINIC | Age: 84
End: 2024-07-08
Payer: MEDICARE

## 2024-07-08 DIAGNOSIS — R26.9 GAIT ABNORMALITY: ICD-10-CM

## 2024-07-08 PROCEDURE — 97110 THERAPEUTIC EXERCISES: CPT | Mod: GP | Performed by: PHYSICAL THERAPIST

## 2024-07-08 NOTE — PROGRESS NOTES
"Physical Therapy Treatment    Patient Name: Jessica Leija  MRN: [id number]  Today's Date: 7/8/2024  Time Calculation  Start Time: 1025  Stop Time: 1107  Time Calculation (min): 42 min  PT Therapeutic Procedures Time Entry  Therapeutic Exercise Time Entry: 42    Insurance:  Visit number: 4 of 6  Authorization info: [place] Visits   Insurance Type: Payor: MEDICARE / Plan: MEDICARE PART A AND B / Product Type: *No Product type* /     Current Problem   1. Gait abnormality  Follow Up In Physical Therapy          Subjective   General   Patient reports \"some days I have good days, some days I have bad days.\" Finds both legs crack a lot at times, but not pain. Right slightly worse.       Precautions: None   Pain 5/10  Post Treatment Pain Level 4/10    Objective   Bilat UE support for sit to stand from normal chair.     Treatments:  Therapeutic Exercise Activity 1: Nustep L4, 6'  Therapeutic Exercise Activity 1: Gastroc stretch at wedge, 30 seconds x 3  Therapeutic Exercise Activity 1: Stand hip ABD 2x10 ea R/L  Therapeutic Exercise Activity 1: Stand MIP 2x10 ea R/L   Therapeutic Exercise Activity 2: Minisquats 2x10  Therapeutic Exercise Activity 3: LAQs 2# 2x10 L/R each  Therapeutic Exercise Activity 3: Hip flex 2x10 L/R each  Therapeutic Exercise Activity 3: Heel slides 2x10 L/R each  Therapeutic Exercise Activity 6: SLR - low height x10 L/R each       Assessment   Assessment:   Continues to require UE support for transfers, however does not require increased time to complete compared to initial visit.       Plan: Add strengthening    OP EDUCATION: Importance of performing HEP daily     Goals:   Active       Mobility       Goal 1       Start:  06/12/24    Expected End:  09/10/24       Pt will improve bilat LE strength to 5/5 to improve I/ADLs.         Goal 2       Start:  06/12/24    Expected End:  09/10/24       Pt will perform ROMBERG for 60 seconds on uneven surface to improve balance and reduce risk of falls.            Pain  "      Goal 1       Start:  06/12/24    Expected End:  09/10/24       Pt will perform all housework with 0/10 pain and no LOB         Goal 2       Start:  06/12/24    Expected End:  09/10/24       Pt will stand/walk >20 min with 0/10 pain and no LOB to improve I/ADLs.

## 2024-07-15 ENCOUNTER — TREATMENT (OUTPATIENT)
Dept: PHYSICAL THERAPY | Facility: CLINIC | Age: 84
End: 2024-07-15
Payer: MEDICARE

## 2024-07-15 DIAGNOSIS — R26.9 GAIT ABNORMALITY: ICD-10-CM

## 2024-07-15 PROCEDURE — 97110 THERAPEUTIC EXERCISES: CPT | Mod: GP | Performed by: PHYSICAL THERAPIST

## 2024-07-15 NOTE — PROGRESS NOTES
Physical Therapy Treatment    Patient Name: Jessica Leija  MRN: [id number]  Today's Date: 7/15/2024  Time Calculation  Start Time: 1010  Stop Time: 1050  Time Calculation (min): 40 min  PT Therapeutic Procedures Time Entry  Therapeutic Exercise Time Entry: 40    Insurance:  Visit number: 5 of 6  Authorization info: [place] visits   Insurance Type: Payor: MEDICARE / Plan: MEDICARE PART A AND B / Product Type: *No Product type* /     Current Problem   1. Gait abnormality  Follow Up In Physical Therapy          Subjective   General   Pt feels like therapy is helping but that it is a slow process. Finds she is able to walk better and get up from chair better.       Precautions: None   Pain 4/10  Post Treatment Pain Level 4/10    Objective   Knee ext: 4+/5  Knee flex: 4+/5    Treatments:  Therapeutic Exercise:  Therapeutic Exercise Activity 1: Nustep L4, 6'  Therapeutic Exercise Activity 1: Gastroc stretch at wedge, 30 seconds x 3  Therapeutic Exercise Activity 1: Stand hip ABD 2x10 ea R/L  Therapeutic Exercise Activity 1: FWD step ups, 2x10   Therapeutic Exercise Activity 2: Minisquats 2x10  Therapeutic Exercise Activity 3: LOS A/P x2 minute  Therapeutic Exercise Activity 3: LOS L/R x2 minute  Therapeutic Exercise Activity 3: Stand Hip flex 2x10 L/R each  Therapeutic Exercise Activity 3: LAQs 2x10 L/R each   Therapeutic Exercise Activity 3: Heel slides 2x10 L/R each  Therapeutic Exercise Activity 3: Seated hip ABD BTB 2x10 ea L/R       NOT TODAY:  Therapeutic Exercise Activity 6: SLR - low height x10 L/R each      Assessment   Assessment:   Improved LE strength and engagement of LE musculature, both concentrically and eccentrically. Able to tolerate increased WB ing and time in standing this date.       Plan:  Reassess next session     OP EDUCATION: Cont with less UE support for transfers.       Goals:   Active       Mobility       Goal 1       Start:  06/12/24    Expected End:  09/10/24       Pt will improve bilat LE strength  to 5/5 to improve I/ADLs.         Goal 2       Start:  06/12/24    Expected End:  09/10/24       Pt will perform ROMBERG for 60 seconds on uneven surface to improve balance and reduce risk of falls.            Pain       Goal 1       Start:  06/12/24    Expected End:  09/10/24       Pt will perform all housework with 0/10 pain and no LOB         Goal 2       Start:  06/12/24    Expected End:  09/10/24       Pt will stand/walk >20 min with 0/10 pain and no LOB to improve I/ADLs.

## 2024-07-22 ENCOUNTER — TREATMENT (OUTPATIENT)
Dept: PHYSICAL THERAPY | Facility: CLINIC | Age: 84
End: 2024-07-22
Payer: MEDICARE

## 2024-07-22 DIAGNOSIS — R26.9 GAIT ABNORMALITY: ICD-10-CM

## 2024-07-22 PROCEDURE — 97110 THERAPEUTIC EXERCISES: CPT | Mod: GP | Performed by: PHYSICAL THERAPIST

## 2024-07-22 NOTE — PROGRESS NOTES
Physical Therapy Treatment/Progress Report    Patient Name: Jessica Leija  MRN: [id number]  Today's Date: 7/22/2024  Time Calculation  Start Time: 1111  Stop Time: 1153  Time Calculation (min): 42 min  PT Therapeutic Procedures Time Entry  Therapeutic Exercise Time Entry: 41    Insurance:  Visit number: 6 of 10  Authorization info: [place]   Insurance Type: Payor: MEDICARE / Plan: MEDICARE PART A AND B / Product Type: *No Product type* /     Current Problem   1. Gait abnormality  Follow Up In Physical Therapy          Subjective   General   Feeling stronger, however recently noticed some outer right lower leg pain, but it is not constant. Still not confident with not using cane or going up curbs.       Precautions: None  Pain 5/10  Post Treatment Pain Level 0/10    Objective   Bilateral LE AROM: WNL and full    Bilateral LE strength:  Hip flex 4/5  Hip ABD 4-/5  Knee flex 4+/5  Knee ext 4+/5    Flexibility: Moderate to mild tightness in bilateral hip flexors  Balance: ROMBERG for 60 seconds, not performing on unstable surfaces yet.   Stairs: Ascend and descend with step-to-pattern using 1 rail   Gait: Ambulates with decreased sixto using standard cane on right. Decreased heel strike bilaterally. Minimal hip and knee flexion during gait, especially on right.       Treatments:  Therapeutic Exercise:  Therapeutic Exercise Activity 1: Nustep L4, 6'  Therapeutic Exercise Activity 1: Gastroc stretch at wedge, 30 seconds x 3  Therapeutic Exercise Activity 1: Stand hip ABD 2x10 ea R/L  Therapeutic Exercise Activity 3: Stand Hip EXT 2x10 L/R each  Therapeutic Exercise Activity 1: FWD step ups, 2x10   Therapeutic Exercise Activity 2: Minisquats 2x10  Therapeutic Exercise Activity 2: Side steps, 20 feet x 4  Therapeutic Exercise Activity 3: LOS A/P x2 minute  Therapeutic Exercise Activity 3: LOS L/R x2 minute        Assessment   Assessment:   Pt with good but slow progress during therapy and has partially met her goals. Pt  with improved gait and LE strength, although still requiring cane during ambulation and UE support for stair negotiation. Will benefit from further PT to improve gait and balance. I recommend continued PT, 1x week for 4 more visits, 10 total in order to fully meet her goals.       Plan: cont 1x week for 4 more visits, 10 total    OP EDUCATION: Cane for safety     Goals:   Active       Mobility       Goal 1 (Progressing)       Start:  06/12/24    Expected End:  09/10/24       Pt will improve bilat LE strength to 5/5 to improve I/ADLs.         Goal 2 (Progressing)       Start:  06/12/24    Expected End:  09/10/24       Pt will perform ROMBERG for 60 seconds on uneven surface to improve balance and reduce risk of falls.            Pain       Goal 1 (Met)       Start:  06/12/24    Expected End:  09/10/24    Resolved:  07/22/24    Pt will perform all housework with 0/10 pain and no LOB         Goal 2 (Progressing)       Start:  06/12/24    Expected End:  09/10/24       Pt will stand/walk >20 min with 0/10 pain and no LOB to improve I/ADLs.

## 2024-07-23 ENCOUNTER — APPOINTMENT (OUTPATIENT)
Dept: PRIMARY CARE | Facility: CLINIC | Age: 84
End: 2024-07-23
Payer: MEDICARE

## 2024-07-23 VITALS
DIASTOLIC BLOOD PRESSURE: 72 MMHG | SYSTOLIC BLOOD PRESSURE: 120 MMHG | BODY MASS INDEX: 31.91 KG/M2 | WEIGHT: 158 LBS | HEART RATE: 75 BPM | OXYGEN SATURATION: 96 %

## 2024-07-23 DIAGNOSIS — Z00.00 HEALTH MAINTENANCE EXAMINATION: Primary | ICD-10-CM

## 2024-07-23 DIAGNOSIS — E78.49 OTHER HYPERLIPIDEMIA: ICD-10-CM

## 2024-07-23 DIAGNOSIS — I10 BENIGN ESSENTIAL HYPERTENSION: ICD-10-CM

## 2024-07-23 DIAGNOSIS — Z00.00 HEALTH CARE MAINTENANCE: ICD-10-CM

## 2024-07-23 DIAGNOSIS — I10 PRIMARY HYPERTENSION: ICD-10-CM

## 2024-07-23 PROCEDURE — 3074F SYST BP LT 130 MM HG: CPT | Performed by: INTERNAL MEDICINE

## 2024-07-23 PROCEDURE — 1036F TOBACCO NON-USER: CPT | Performed by: INTERNAL MEDICINE

## 2024-07-23 PROCEDURE — 3078F DIAST BP <80 MM HG: CPT | Performed by: INTERNAL MEDICINE

## 2024-07-23 PROCEDURE — 1160F RVW MEDS BY RX/DR IN RCRD: CPT | Performed by: INTERNAL MEDICINE

## 2024-07-23 PROCEDURE — 1159F MED LIST DOCD IN RCRD: CPT | Performed by: INTERNAL MEDICINE

## 2024-07-23 PROCEDURE — 99213 OFFICE O/P EST LOW 20 MIN: CPT | Performed by: INTERNAL MEDICINE

## 2024-07-23 ASSESSMENT — ENCOUNTER SYMPTOMS
OCCASIONAL FEELINGS OF UNSTEADINESS: 0
LOSS OF SENSATION IN FEET: 0
WEAKNESS: 1
CONFUSION: 0
ACTIVITY CHANGE: 0
DEPRESSION: 0
SHORTNESS OF BREATH: 0

## 2024-07-23 NOTE — PROGRESS NOTES
Subjective   Patient ID: Jessica Leija is a 84 y.o. female who presents for No chief complaint on file..    HPI patient is here for generalized follow-up but also follow-up of her leg weakness and gait abnormality she has completed 5 weeks of physical therapy and will be doing another 4 weeks.  This is showed improvement she feels better more strength more balance able to lift her legs and getting in and out of the car    Review of Systems   Constitutional:  Negative for activity change.   Respiratory:  Negative for shortness of breath.    Cardiovascular:  Negative for chest pain.   Musculoskeletal:  Positive for gait problem.   Neurological:  Positive for weakness.   Psychiatric/Behavioral:  Negative for confusion.        Objective   /70 (BP Location: Right arm, Patient Position: Sitting)   Pulse 75   Wt 71.7 kg (158 lb)   SpO2 96%   BMI 31.91 kg/m²     Physical Exam  Cardiovascular:      Rate and Rhythm: Regular rhythm.      Heart sounds: No murmur heard.  Pulmonary:      Breath sounds: Normal breath sounds.   Musculoskeletal:      Right lower leg: Edema present.      Left lower leg: Edema present.   Neurological:      Mental Status: She is alert.      Gait: Gait abnormal (Patient can ambulate in the exam room without her cane does better with it).   Psychiatric:         Mood and Affect: Mood normal.         Assessment/Plan   Diagnoses and all orders for this visit:  Health maintenance examination  -     CBC and Auto Differential; Future  Health care maintenance  -     Comprehensive Metabolic Panel; Future  Benign essential hypertension  -     Lipid Panel; Future  Other hyperlipidemia  -     Lipid Panel; Future  Primary hypertension  -     CBC and Auto Differential; Future  Gait abnormalities you are doing better physical therapy is helping just like we talked about last time continue doing it make sure you do the exercises at home see me at the end of October

## 2024-07-30 ENCOUNTER — TREATMENT (OUTPATIENT)
Dept: PHYSICAL THERAPY | Facility: CLINIC | Age: 84
End: 2024-07-30
Payer: MEDICARE

## 2024-07-30 DIAGNOSIS — R26.9 GAIT ABNORMALITY: ICD-10-CM

## 2024-07-30 PROCEDURE — 97110 THERAPEUTIC EXERCISES: CPT | Mod: GP | Performed by: PHYSICAL THERAPIST

## 2024-07-30 NOTE — PROGRESS NOTES
Physical Therapy Treatment    Patient Name: Jessica Leija  MRN: [id number]  Today's Date: 7/30/2024       Insurance:  Visit number: 7 of 10  Authorization info: [place] [facility]   Insurance Type: Payor: MEDICARE / Plan: MEDICARE PART A AND B / Product Type: *No Product type* /     Current Problem   1. Gait abnormality  Follow Up In Physical Therapy          Subjective   General   Has noticed increased cracking in her legs, right significantly more than left, lately. Walking still feels slow but attributing that to her feet.     Precautions: None   Pain 5/10  Post Treatment Pain Level 5/10    Objective   No edema, but tenderness to right tibialis anterior, proximal muscle belly. Mod tightness at muscle belly     Treatments:  Therapeutic Exercise:  Therapeutic Exercise Activity 1: Nustep L4, 6'  Therapeutic Exercise Activity 1: Gastroc stretch at wedge, 30 seconds x 3  Therapeutic Exercise Activity 1: Stand hip ABD 2x10 ea R/L  Therapeutic Exercise Activity 3: Stand Hip EXT 2x10 ea R/L  Therapeutic Exercise Activity 1: FWD step ups, 2x10   Therapeutic Exercise Activity 1: LAT step ups, 2x10 L lead  Therapeutic Exercise Activity 2: Minisquats 2x10  Therapeutic Exercise Activity 2: Side steps, 20 feet x 4  Therapeutic Exercise Activity 3: LOS A/P x2 minute  Therapeutic Exercise Activity 3: LOS L/R x2 minute      Assessment   Assessment:   Introduced laterally based motions, but unable to perform full weight shift and strengthening to right due to pain/discomfort, will work on progressing to this.        Plan: Progress strengthening    OP EDUCATION: Increase focus on right LE with HEP    Goals:   Active       Mobility       Goal 1 (Progressing)       Start:  06/12/24    Expected End:  09/10/24       Pt will improve bilat LE strength to 5/5 to improve I/ADLs.         Goal 2 (Progressing)       Start:  06/12/24    Expected End:  09/10/24       Pt will perform ROMBERG for 60 seconds on uneven surface to improve balance and reduce  risk of falls.            Pain       Goal 1 (Met)       Start:  06/12/24    Expected End:  09/10/24    Resolved:  07/22/24    Pt will perform all housework with 0/10 pain and no LOB         Goal 2 (Progressing)       Start:  06/12/24    Expected End:  09/10/24       Pt will stand/walk >20 min with 0/10 pain and no LOB to improve I/ADLs.

## 2024-07-30 NOTE — PROGRESS NOTES
Physical Therapy Treatment    Patient Name: Jessica Leija  MRN: [id number]  Today's Date: 7/30/2024  Time Calculation  Start Time: 1131  Stop Time: 1211  Time Calculation (min): 40 min  PT Therapeutic Procedures Time Entry  Therapeutic Exercise Time Entry: 40    Insurance:  Visit number: 7 of 10  Authorization info: [place] [facility]   Insurance Type: Payor: MEDICARE / Plan: MEDICARE PART A AND B / Product Type: *No Product type* /     Current Problem   1. Gait abnormality  Follow Up In Physical Therapy          Subjective   General   Has noticed increased cracking in her legs, right significantly more than left, lately. Walking still feels slow but attributing that to her feet.     Precautions: None   Pain 5/10  Post Treatment Pain Level 5/10    Objective   No edema, but tenderness to right tibialis anterior, proximal muscle belly. Mod tightness at muscle belly     Treatments:  Therapeutic Exercise:  Therapeutic Exercise Activity 1: Nustep L4, 6'  Therapeutic Exercise Activity 1: Gastroc stretch at wedge, 30 seconds x 3  Therapeutic Exercise Activity 1: Stand hip ABD 2x10 ea R/L  Therapeutic Exercise Activity 3: Stand Hip EXT 2x10 ea R/L  Therapeutic Exercise Activity 1: FWD step ups, 2x10   Therapeutic Exercise Activity 1: LAT step ups, 2x10 L lead  Therapeutic Exercise Activity 2: Minisquats 2x10  Therapeutic Exercise Activity 2: Side steps, 20 feet x 4  Therapeutic Exercise Activity 3: LOS A/P x2 minute  Therapeutic Exercise Activity 3: LOS L/R x2 minute      Assessment   Assessment:   Introduced laterally based motions, but unable to perform full weight shift and strengthening to right due to pain/discomfort, will work on progressing to this.        Plan: Progress strengthening    OP EDUCATION: Increase focus on right LE with HEP    Goals:   Active       Mobility       Goal 1 (Progressing)       Start:  06/12/24    Expected End:  09/10/24       Pt will improve bilat LE strength to 5/5 to improve I/ADLs.         Goal 2  (Progressing)       Start:  06/12/24    Expected End:  09/10/24       Pt will perform ROMBERG for 60 seconds on uneven surface to improve balance and reduce risk of falls.            Pain       Goal 1 (Met)       Start:  06/12/24    Expected End:  09/10/24    Resolved:  07/22/24    Pt will perform all housework with 0/10 pain and no LOB         Goal 2 (Progressing)       Start:  06/12/24    Expected End:  09/10/24       Pt will stand/walk >20 min with 0/10 pain and no LOB to improve I/ADLs.

## 2024-08-06 ENCOUNTER — APPOINTMENT (OUTPATIENT)
Dept: PHYSICAL THERAPY | Facility: CLINIC | Age: 84
End: 2024-08-06
Payer: MEDICARE

## 2024-08-12 ENCOUNTER — TREATMENT (OUTPATIENT)
Dept: PHYSICAL THERAPY | Facility: CLINIC | Age: 84
End: 2024-08-12
Payer: MEDICARE

## 2024-08-12 DIAGNOSIS — R26.9 GAIT ABNORMALITY: ICD-10-CM

## 2024-08-12 PROCEDURE — 97110 THERAPEUTIC EXERCISES: CPT | Mod: GP | Performed by: PHYSICAL THERAPIST

## 2024-08-12 NOTE — PROGRESS NOTES
"Physical Therapy Treatment    Patient Name: Jessica Leija  MRN: [id number]  Today's Date: 8/12/2024  Time Calculation  Start Time: 0931  Stop Time: 1011  Time Calculation (min): 40 min  PT Therapeutic Procedures Time Entry  Therapeutic Exercise Time Entry: 40    Insurance:  Visit number: 8 of 10  Authorization info: [place] [facility]   Insurance Type: Payor: MEDICARE / Plan: MEDICARE PART A AND B / Product Type: *No Product type* /     Current Problem   1. Gait abnormality  Follow Up In Physical Therapy          Subjective   General   Pt feels overall tired, thinks she overdid it this weekend when attending family gathering. Pain in her legs are no longer constant and it is now \"coming and going.\"      Precautions: None  Pain 5/10  Post Treatment Pain Level 4/10    Objective   Ambulates with standard cane and moderately decreased sixto. Right LE valgus. Minimal hip and knee flexion with no heel to toe.     Treatments:  Therapeutic Exercise Activity 1: Nustep L4, 6'  Therapeutic Exercise Activity 1: Gastroc stretch at wedge, 30 seconds x 3  Therapeutic Exercise Activity 1: Stand hip ABD 3x10 ea R/L  Therapeutic Exercise Activity 3: Stand Hip EXT 2x10 ea R/L  Therapeutic Exercise Activity 1: FWD step ups, 2x10   Therapeutic Exercise Activity 1: LAT step ups, 2x10 L lead  Therapeutic Exercise Activity 2: Minisquats 2x10  Therapeutic Exercise Activity 2: Side steps, 20 feet x 4  Therapeutic Exercise Activity 3: LOS A/P x2 minute  Therapeutic Exercise Activity 3: LOS L/R x2 minute      Assessment   Assessment:   Continues to exhibit gait deficits, especially for extended distances. Limited endurance, but without UE support for LOS this date.    Plan: Pt feels she may be ready for discharge    OP EDUCATION: Increase walking time as able    Goals:   Active       Mobility       Goal 1 (Progressing)       Start:  06/12/24    Expected End:  09/10/24       Pt will improve bilat LE strength to 5/5 to improve I/ADLs.         Goal 2 " (Progressing)       Start:  06/12/24    Expected End:  09/10/24       Pt will perform ROMBERG for 60 seconds on uneven surface to improve balance and reduce risk of falls.            Pain       Goal 1 (Met)       Start:  06/12/24    Expected End:  09/10/24    Resolved:  07/22/24    Pt will perform all housework with 0/10 pain and no LOB         Goal 2 (Progressing)       Start:  06/12/24    Expected End:  09/10/24       Pt will stand/walk >20 min with 0/10 pain and no LOB to improve I/ADLs.

## 2024-08-29 ENCOUNTER — LAB (OUTPATIENT)
Dept: LAB | Facility: LAB | Age: 84
End: 2024-08-29
Payer: MEDICARE

## 2024-08-29 DIAGNOSIS — Z00.00 HEALTH CARE MAINTENANCE: ICD-10-CM

## 2024-08-29 DIAGNOSIS — E78.49 OTHER HYPERLIPIDEMIA: ICD-10-CM

## 2024-08-29 DIAGNOSIS — I10 PRIMARY HYPERTENSION: ICD-10-CM

## 2024-08-29 DIAGNOSIS — I10 BENIGN ESSENTIAL HYPERTENSION: ICD-10-CM

## 2024-08-29 DIAGNOSIS — Z00.00 HEALTH MAINTENANCE EXAMINATION: ICD-10-CM

## 2024-08-29 LAB
ALBUMIN SERPL-MCNC: 3.8 G/DL (ref 3.5–5)
ALP BLD-CCNC: 124 U/L (ref 35–125)
ALT SERPL-CCNC: 12 U/L (ref 5–40)
ANION GAP SERPL CALC-SCNC: 10 MMOL/L
AST SERPL-CCNC: 20 U/L (ref 5–40)
BASOPHILS # BLD AUTO: 0.03 X10*3/UL (ref 0–0.1)
BASOPHILS NFR BLD AUTO: 0.4 %
BILIRUB SERPL-MCNC: 0.8 MG/DL (ref 0.1–1.2)
BUN SERPL-MCNC: 23 MG/DL (ref 8–25)
CALCIUM SERPL-MCNC: 9.2 MG/DL (ref 8.5–10.4)
CHLORIDE SERPL-SCNC: 105 MMOL/L (ref 97–107)
CHOLEST SERPL-MCNC: 116 MG/DL (ref 133–200)
CHOLEST/HDLC SERPL: 1.9 {RATIO}
CO2 SERPL-SCNC: 26 MMOL/L (ref 24–31)
CREAT SERPL-MCNC: 0.8 MG/DL (ref 0.4–1.6)
EGFRCR SERPLBLD CKD-EPI 2021: 73 ML/MIN/1.73M*2
EOSINOPHIL # BLD AUTO: 0.11 X10*3/UL (ref 0–0.4)
EOSINOPHIL NFR BLD AUTO: 1.6 %
ERYTHROCYTE [DISTWIDTH] IN BLOOD BY AUTOMATED COUNT: 14.7 % (ref 11.5–14.5)
GLUCOSE SERPL-MCNC: 94 MG/DL (ref 65–99)
HCT VFR BLD AUTO: 36.9 % (ref 36–46)
HDLC SERPL-MCNC: 60 MG/DL
HGB BLD-MCNC: 12.1 G/DL (ref 12–16)
IMM GRANULOCYTES # BLD AUTO: 0.03 X10*3/UL (ref 0–0.5)
IMM GRANULOCYTES NFR BLD AUTO: 0.4 % (ref 0–0.9)
LDLC SERPL CALC-MCNC: 44 MG/DL (ref 65–130)
LYMPHOCYTES # BLD AUTO: 1.57 X10*3/UL (ref 0.8–3)
LYMPHOCYTES NFR BLD AUTO: 23.4 %
MCH RBC QN AUTO: 28.1 PG (ref 26–34)
MCHC RBC AUTO-ENTMCNC: 32.8 G/DL (ref 32–36)
MCV RBC AUTO: 86 FL (ref 80–100)
MONOCYTES # BLD AUTO: 0.89 X10*3/UL (ref 0.05–0.8)
MONOCYTES NFR BLD AUTO: 13.3 %
NEUTROPHILS # BLD AUTO: 4.07 X10*3/UL (ref 1.6–5.5)
NEUTROPHILS NFR BLD AUTO: 60.9 %
NRBC BLD-RTO: 0 /100 WBCS (ref 0–0)
PLATELET # BLD AUTO: 180 X10*3/UL (ref 150–450)
POTASSIUM SERPL-SCNC: 4.2 MMOL/L (ref 3.4–5.1)
PROT SERPL-MCNC: 6.5 G/DL (ref 5.9–7.9)
RBC # BLD AUTO: 4.3 X10*6/UL (ref 4–5.2)
SODIUM SERPL-SCNC: 141 MMOL/L (ref 133–145)
TRIGL SERPL-MCNC: 59 MG/DL (ref 40–150)
WBC # BLD AUTO: 6.7 X10*3/UL (ref 4.4–11.3)

## 2024-08-29 PROCEDURE — 36415 COLL VENOUS BLD VENIPUNCTURE: CPT

## 2024-08-29 PROCEDURE — 80061 LIPID PANEL: CPT

## 2024-08-29 PROCEDURE — 80053 COMPREHEN METABOLIC PANEL: CPT

## 2024-08-29 PROCEDURE — 85025 COMPLETE CBC W/AUTO DIFF WBC: CPT

## 2024-09-05 ENCOUNTER — APPOINTMENT (OUTPATIENT)
Dept: PRIMARY CARE | Facility: CLINIC | Age: 84
End: 2024-09-05
Payer: MEDICARE

## 2024-09-05 VITALS
BODY MASS INDEX: 31.51 KG/M2 | DIASTOLIC BLOOD PRESSURE: 70 MMHG | WEIGHT: 156 LBS | HEART RATE: 69 BPM | SYSTOLIC BLOOD PRESSURE: 129 MMHG | OXYGEN SATURATION: 97 %

## 2024-09-05 DIAGNOSIS — L82.1 SEBORRHEIC KERATOSIS: ICD-10-CM

## 2024-09-05 DIAGNOSIS — I10 PRIMARY HYPERTENSION: Primary | ICD-10-CM

## 2024-09-05 DIAGNOSIS — R29.898 WEAKNESS OF BOTH LOWER EXTREMITIES: ICD-10-CM

## 2024-09-05 PROCEDURE — 1159F MED LIST DOCD IN RCRD: CPT | Performed by: INTERNAL MEDICINE

## 2024-09-05 PROCEDURE — 3078F DIAST BP <80 MM HG: CPT | Performed by: INTERNAL MEDICINE

## 2024-09-05 PROCEDURE — 3074F SYST BP LT 130 MM HG: CPT | Performed by: INTERNAL MEDICINE

## 2024-09-05 PROCEDURE — 1160F RVW MEDS BY RX/DR IN RCRD: CPT | Performed by: INTERNAL MEDICINE

## 2024-09-05 PROCEDURE — 1036F TOBACCO NON-USER: CPT | Performed by: INTERNAL MEDICINE

## 2024-09-05 PROCEDURE — 99214 OFFICE O/P EST MOD 30 MIN: CPT | Performed by: INTERNAL MEDICINE

## 2024-09-05 ASSESSMENT — PATIENT HEALTH QUESTIONNAIRE - PHQ9
1. LITTLE INTEREST OR PLEASURE IN DOING THINGS: NOT AT ALL
SUM OF ALL RESPONSES TO PHQ9 QUESTIONS 1 AND 2: 0
2. FEELING DOWN, DEPRESSED OR HOPELESS: NOT AT ALL

## 2024-09-05 ASSESSMENT — ENCOUNTER SYMPTOMS
DEPRESSION: 0
LOSS OF SENSATION IN FEET: 0
OCCASIONAL FEELINGS OF UNSTEADINESS: 0

## 2024-09-05 NOTE — PROGRESS NOTES
Subjective   Patient ID: Jessica Leija is a 84 y.o. female who presents for No chief complaint on file..    HPI patient comes would like me to look at 2 or 3 lesions on her back    Review of Systems  She stopped physical therapy she was reaching her point of not improving anymore but she does have exercises to do at home.  She is not interested in any type of water aerobics or .    Objective   BP (!) 183/70 (BP Location: Left arm, Patient Position: Sitting)   Pulse 69   Wt 70.8 kg (156 lb)   SpO2 97%   BMI 31.51 kg/m²   Blood pressure at home routinely runs between 120 and 129/70 my reading today is 160/70  Physical Exam  Skin she has 3 areas that represent seborrheic keratosis benign-appearing lesions  Assessment/Plan   Diagnoses and all orders for this visit:  Primary hypertension  It seems like you have more in office hypertension running normal at home this is good.  Follow-up in a month we will recheck it bring your device  Seborrheic keratosis  The lesions on your back are benign  Weakness of both lower extremities  He would like to see how you do off atorvastatin you have 4 friends that did this and they improve their walking.  This will be okay we will see how you are doing and 1 month he only take low-dose 5 days a week I think you will be okay continue with the home PT

## 2024-10-09 ENCOUNTER — APPOINTMENT (OUTPATIENT)
Dept: PRIMARY CARE | Facility: CLINIC | Age: 84
End: 2024-10-09
Payer: MEDICARE

## 2024-10-09 VITALS
RESPIRATION RATE: 18 BRPM | HEART RATE: 68 BPM | SYSTOLIC BLOOD PRESSURE: 135 MMHG | WEIGHT: 158 LBS | BODY MASS INDEX: 31.91 KG/M2 | DIASTOLIC BLOOD PRESSURE: 78 MMHG | OXYGEN SATURATION: 97 %

## 2024-10-09 DIAGNOSIS — E78.49 OTHER HYPERLIPIDEMIA: ICD-10-CM

## 2024-10-09 DIAGNOSIS — L84 CALLUS: ICD-10-CM

## 2024-10-09 DIAGNOSIS — I10 BENIGN ESSENTIAL HYPERTENSION: ICD-10-CM

## 2024-10-09 DIAGNOSIS — M20.5X9 ACQUIRED CLAW TOE, UNSPECIFIED LATERALITY: Primary | ICD-10-CM

## 2024-10-09 PROCEDURE — 1159F MED LIST DOCD IN RCRD: CPT | Performed by: INTERNAL MEDICINE

## 2024-10-09 PROCEDURE — 3078F DIAST BP <80 MM HG: CPT | Performed by: INTERNAL MEDICINE

## 2024-10-09 PROCEDURE — 1036F TOBACCO NON-USER: CPT | Performed by: INTERNAL MEDICINE

## 2024-10-09 PROCEDURE — 3075F SYST BP GE 130 - 139MM HG: CPT | Performed by: INTERNAL MEDICINE

## 2024-10-09 PROCEDURE — 1123F ACP DISCUSS/DSCN MKR DOCD: CPT | Performed by: INTERNAL MEDICINE

## 2024-10-09 PROCEDURE — 1158F ADVNC CARE PLAN TLK DOCD: CPT | Performed by: INTERNAL MEDICINE

## 2024-10-09 PROCEDURE — 90662 IIV NO PRSV INCREASED AG IM: CPT | Performed by: INTERNAL MEDICINE

## 2024-10-09 PROCEDURE — G0008 ADMIN INFLUENZA VIRUS VAC: HCPCS | Performed by: INTERNAL MEDICINE

## 2024-10-09 PROCEDURE — 99213 OFFICE O/P EST LOW 20 MIN: CPT | Performed by: INTERNAL MEDICINE

## 2024-10-09 ASSESSMENT — ENCOUNTER SYMPTOMS
OCCASIONAL FEELINGS OF UNSTEADINESS: 1
LOSS OF SENSATION IN FEET: 0
DEPRESSION: 0

## 2024-10-09 NOTE — PROGRESS NOTES
Subjective   Patient ID: Jessica Leija is a 84 y.o. female who presents for No chief complaint on file..    HPI 84-year-old female well-known to me who comes today for follow-up hypertension.  Last visit her blood pressure was high in the office she checks at home has a rather new [employer] device so I asked her to follow-up in a month and bring it in.  In addition she had a lot of leg achiness and was interested in going off of atorvastatin and see if that would help which it has to some degree.  Cardiology recommend she go back on it for a month and see if the symptoms return    Review of Systems  Patient's other problem is a lot of foot problems mainly calluses that are painful she is currently being treated by podiatry with orthotics and filing  Objective   Pulse 68   Resp 18   Wt 71.7 kg (158 lb)   SpO2 97%   BMI 31.91 kg/m²     Physical Exam  My reading 170/80 right and left arm her reading with the [employer] about the same.  In reviewing all her readings at home they are in the 1 30-1 40 range  Lungs crackles at the bases  Heart regular rate and rhythm without gallop rub or murmur  Extremities trace edema she has support impression hose  Assessment/Plan   Diagnoses and all orders for this visit:  Acquired claw toe, unspecified laterality  -Continue with your podiatry plan  Callus  -Continue with your podiatry plan  Benign essential hypertension  -You have in office hypertension we have documented this.  I do not want to increase your blood pressure pills continue to check your blood pressure at home perhaps once or twice a week if it is going any higher than what you are seeing today let me know otherwise follow-up 1 month  Other hyperlipidemia  - Okay to restart your atorvastatin for a month and we will see how you do if your legs are aching again then we may need to come up with a different plan  Other orders  -     Flu vaccine, trivalent, preservative free, HIGH-DOSE, age 65y+ (Fluzone)

## 2024-10-16 ENCOUNTER — DOCUMENTATION (OUTPATIENT)
Dept: PHYSICAL THERAPY | Facility: CLINIC | Age: 84
End: 2024-10-16
Payer: MEDICARE

## 2024-10-16 NOTE — PROGRESS NOTES
Physical Therapy    Discharge Summary    Name: Jessica Leija  MRN: [id number]  : 1940  Date: 10/16/24    Discharge Summary: PT    Discharge Information: Date of evaluation 2024 and Number of attended visits 8    Therapy Summary: Pt with slow but good progress during therapy and has met her goals. She has improved balance and gait, with less reliance on assistive device.     Discharge Status: cont with HEP on own, cautious with gait     Rehab Discharge Reason: Achieved all and/or the most significant goals(s)

## 2024-10-23 ENCOUNTER — APPOINTMENT (OUTPATIENT)
Dept: PRIMARY CARE | Facility: CLINIC | Age: 84
End: 2024-10-23
Payer: MEDICARE

## 2024-11-13 ENCOUNTER — APPOINTMENT (OUTPATIENT)
Dept: PRIMARY CARE | Facility: CLINIC | Age: 84
End: 2024-11-13
Payer: MEDICARE

## 2024-11-13 VITALS
BODY MASS INDEX: 32.62 KG/M2 | HEIGHT: 59 IN | DIASTOLIC BLOOD PRESSURE: 78 MMHG | HEART RATE: 64 BPM | OXYGEN SATURATION: 98 % | WEIGHT: 161.82 LBS | SYSTOLIC BLOOD PRESSURE: 156 MMHG

## 2024-11-13 DIAGNOSIS — R60.0 LOCALIZED EDEMA: ICD-10-CM

## 2024-11-13 DIAGNOSIS — L84 CALLUS: ICD-10-CM

## 2024-11-13 DIAGNOSIS — I48.0 PAROXYSMAL ATRIAL FIBRILLATION (MULTI): ICD-10-CM

## 2024-11-13 DIAGNOSIS — I10 BENIGN ESSENTIAL HYPERTENSION: Primary | ICD-10-CM

## 2024-11-13 PROCEDURE — 3078F DIAST BP <80 MM HG: CPT | Performed by: INTERNAL MEDICINE

## 2024-11-13 PROCEDURE — 1123F ACP DISCUSS/DSCN MKR DOCD: CPT | Performed by: INTERNAL MEDICINE

## 2024-11-13 PROCEDURE — 1159F MED LIST DOCD IN RCRD: CPT | Performed by: INTERNAL MEDICINE

## 2024-11-13 PROCEDURE — 1036F TOBACCO NON-USER: CPT | Performed by: INTERNAL MEDICINE

## 2024-11-13 PROCEDURE — 3077F SYST BP >= 140 MM HG: CPT | Performed by: INTERNAL MEDICINE

## 2024-11-13 PROCEDURE — 99214 OFFICE O/P EST MOD 30 MIN: CPT | Performed by: INTERNAL MEDICINE

## 2024-11-13 ASSESSMENT — ENCOUNTER SYMPTOMS
DEPRESSION: 0
OCCASIONAL FEELINGS OF UNSTEADINESS: 0
DIZZINESS: 0
SHORTNESS OF BREATH: 0
WEAKNESS: 1
LOSS OF SENSATION IN FEET: 0
PALPITATIONS: 0
ACTIVITY CHANGE: 1
CONFUSION: 0

## 2024-11-13 NOTE — PROGRESS NOTES
"Subjective   Patient ID: Jessica Leija is a 84 y.o. female who presents for Follow-up.    HPI patient comes today for follow-up hypertension.  She has well-documented in office hypertension for example she has been checking her blood pressure at home and getting readings of 121 systolic today my reading was 162 she feels okay other than continued pain in her feet    Review of Systems   Constitutional:  Positive for activity change.   Respiratory:  Negative for shortness of breath.    Cardiovascular:  Positive for leg swelling. Negative for chest pain and palpitations.   Musculoskeletal:  Positive for gait problem.   Neurological:  Positive for weakness. Negative for dizziness.   Psychiatric/Behavioral:  Negative for confusion.        Objective   /78 (BP Location: Left arm, Patient Position: Sitting)   Pulse 64   Ht 1.499 m (4' 11\")   Wt 73.4 kg (161 lb 13.1 oz)   SpO2 98%   BMI 32.68 kg/m²     Physical Exam  Cardiovascular:      Rate and Rhythm: Regular rhythm.      Heart sounds: Normal heart sounds.   Pulmonary:      Breath sounds: Normal breath sounds.   Neurological:      Gait: Gait abnormal.   Psychiatric:         Thought Content: Thought content normal.         Assessment/Plan   Diagnoses and all orders for this visit:  Benign essential hypertension  Patient has well-documented in office hypertension continue to monitor your blood pressure at home goal is less than 130/80  Localized edema  This looks like it is under decent control  Paroxysmal atrial fibrillation (Multi)  You are in normal rhythm today  Callus  You see podiatry your feet are very painful you have asked if there is any group that can come to your home and help in the future.  I gave you a list of home care agencies  Follow-up 4 months       "

## 2025-02-11 ENCOUNTER — APPOINTMENT (OUTPATIENT)
Dept: PRIMARY CARE | Facility: CLINIC | Age: 85
End: 2025-02-11
Payer: MEDICARE

## 2025-02-11 VITALS
RESPIRATION RATE: 18 BRPM | OXYGEN SATURATION: 98 % | HEART RATE: 62 BPM | SYSTOLIC BLOOD PRESSURE: 162 MMHG | DIASTOLIC BLOOD PRESSURE: 70 MMHG | WEIGHT: 160 LBS | BODY MASS INDEX: 32.32 KG/M2

## 2025-02-11 DIAGNOSIS — G89.29 CHRONIC PAIN OF RIGHT KNEE: Primary | ICD-10-CM

## 2025-02-11 DIAGNOSIS — M25.561 CHRONIC PAIN OF RIGHT KNEE: Primary | ICD-10-CM

## 2025-02-11 PROCEDURE — 1159F MED LIST DOCD IN RCRD: CPT | Performed by: INTERNAL MEDICINE

## 2025-02-11 PROCEDURE — 99214 OFFICE O/P EST MOD 30 MIN: CPT | Performed by: INTERNAL MEDICINE

## 2025-02-11 PROCEDURE — 1123F ACP DISCUSS/DSCN MKR DOCD: CPT | Performed by: INTERNAL MEDICINE

## 2025-02-11 PROCEDURE — 3077F SYST BP >= 140 MM HG: CPT | Performed by: INTERNAL MEDICINE

## 2025-02-11 PROCEDURE — 1036F TOBACCO NON-USER: CPT | Performed by: INTERNAL MEDICINE

## 2025-02-11 PROCEDURE — 3078F DIAST BP <80 MM HG: CPT | Performed by: INTERNAL MEDICINE

## 2025-02-11 RX ORDER — FUROSEMIDE 20 MG/1
TABLET ORAL
COMMUNITY
Start: 2025-01-29

## 2025-02-11 ASSESSMENT — ENCOUNTER SYMPTOMS
LOSS OF SENSATION IN FEET: 0
OCCASIONAL FEELINGS OF UNSTEADINESS: 1
DEPRESSION: 0

## 2025-02-11 NOTE — PROGRESS NOTES
Subjective   Patient ID: Jessica Leija is a 84 y.o. female who presents for Follow-up.    HPI   84-year-old female comes today for follow-up.  Review of Systems  Patient is doing okay but having more difficult time getting around.  She is complaining of more more right knee pain she has chronic foot pain sees podiatry on a monthly basis.  They may want to do surgery on her toes anytime.  She has never had any x-rays on her right knee but has been gradually giving her more more problem  Objective   /70 (BP Location: Left arm, Patient Position: Sitting)   Pulse 62   Resp 18   Wt 72.6 kg (160 lb)   SpO2 98%   BMI 32.32 kg/m²     Physical Exam  Lungs are clear to auscultation  Heart regular rate and rhythm 1/6 murmur  Right knee small effusion positive Keith's no warmth or redness and decreased range of motion  Left knee good range of motion no warmth redness no effusion  Assessment/Plan   Diagnoses and all orders for this visit:  Chronic pain of right knee  - Her exam and history is consistent with degenerative arthritis I think she would benefit from orthopedic sports medicine evaluation x-ray and possible injection and PT.  She is amendable to this.  -     Referral to Sports Medicine; Future  Hypertension blood pressure is a little bit higher today follow-up 1 month

## 2025-02-25 ENCOUNTER — APPOINTMENT (OUTPATIENT)
Dept: ORTHOPEDIC SURGERY | Facility: CLINIC | Age: 85
End: 2025-02-25
Payer: MEDICARE

## 2025-02-25 ENCOUNTER — HOSPITAL ENCOUNTER (OUTPATIENT)
Dept: RADIOLOGY | Facility: CLINIC | Age: 85
Discharge: HOME | End: 2025-02-25
Payer: MEDICARE

## 2025-02-25 DIAGNOSIS — M25.561 CHRONIC PAIN OF RIGHT KNEE: ICD-10-CM

## 2025-02-25 DIAGNOSIS — G89.29 CHRONIC PAIN OF RIGHT KNEE: ICD-10-CM

## 2025-02-25 DIAGNOSIS — M17.11 PRIMARY LOCALIZED OSTEOARTHRITIS OF RIGHT KNEE: Primary | ICD-10-CM

## 2025-02-25 PROCEDURE — 1123F ACP DISCUSS/DSCN MKR DOCD: CPT | Performed by: FAMILY MEDICINE

## 2025-02-25 PROCEDURE — 73564 X-RAY EXAM KNEE 4 OR MORE: CPT | Mod: RT

## 2025-02-25 PROCEDURE — 99204 OFFICE O/P NEW MOD 45 MIN: CPT | Performed by: FAMILY MEDICINE

## 2025-02-25 PROCEDURE — 1159F MED LIST DOCD IN RCRD: CPT | Performed by: FAMILY MEDICINE

## 2025-02-25 PROCEDURE — 73564 X-RAY EXAM KNEE 4 OR MORE: CPT | Mod: RIGHT SIDE | Performed by: RADIOLOGY

## 2025-02-25 PROCEDURE — 1036F TOBACCO NON-USER: CPT | Performed by: FAMILY MEDICINE

## 2025-02-25 NOTE — PROGRESS NOTES
** Please excuse any errors in grammar or translation related to this dictation. Voice recognition software was utilized to prepare this document. **    Assessment & Plan:  Clinical presentation is most consistent with right knee arthritis, most advanced in lateral compartment.   Discuss with patient the nature of this disease and how it can be progressive.  Discuss how symptoms can wax and wane in severity. Management options reviewed below.  Patient provided handout that reviews this condition and available treatments.   - Maintaining a healthy weight: Every pound of bodyweight is about 4-5 pounds through the lower extremity.   - Exercise program to improve quad & hamstring strength to support joint. She previously did formal physical therapy and elected to do home exercise program instead   - Activity modifications as needed to include use of cane/walker or braces. She uses a cane.  - Analgesics to include but not limited to Tylenol up to 3g daily; topical diclofenac gel (Voltaren) up to 4x/daily.  Discussed optimization of diclofenac gel and tylenol. She is on eliquis so avoid oral NSAIDs.   - Steroid injection.   - Hyaluronic acid injection.  - Referral to arthroplastic surgeon for potential joint replacement. At this time not interested in pursuing given her age.     After considering treatment options, patient elects to do home exercise program and optimization of diclofenac gel and tylenol. If after 4-6 weeks and not adequately controlling her pain, she will reconsider treatment options and likely have CSI completed. All questions answered and patient is agreeable to this plan.     Chief complaint:  Right knee pain    HPI:  83 y/o femlae, hx of lymphedema and paroxysmal afib on eliquis, presents with right anterior knee pain.  This complaint has been ongoing for a year.  No mechanism of injury reported at onset. Symptoms have progressively worsened with time.  Pain is most prominent at anterior knee though  it is also present into the lower leg at times.  It is associated with crackling/popping sensation. Endorses gives out but no locking. Symptoms are aggravated by walking. She does not do stairs, kneel, squat. Treatment to date includes cane. She takes Eliquis so she is unable to take nsaids.  Denies previous surgery to this site.     Patient Active Problem List   Diagnosis    Abnormal MRI    Abscess of right shoulder    Acquired claw toe    Acquired hallux valgus of left foot    Acquired hallux varus of right foot    Age-related macular degeneration    Benign essential hypertension    Benign paroxysmal positional vertigo    Callus    Chronic bilateral low back pain without sciatica    Chronic toe pain, left foot    Drainage from wound    Edema    Hammer toe of left foot    Hyperlipidemia    Hypertension    IFG (impaired fasting glucose)    Irregular heartbeat    Nontoxic multinodular goiter    Paroxysmal atrial fibrillation (Multi)    Rectal bleeding    Sciatica of right side    Sebaceous cyst    Solitary thyroid nodule    Spinal stenosis of lumbar region with neurogenic claudication    Vitamin D deficiency    Ataxia    Bruit of right carotid artery    Class 1 obesity with body mass index (BMI) of 33.0 to 33.9 in adult    Congestive heart failure (CHF)    History of cancer of uterus    Heart murmur    Mild anemia    OA (osteoarthritis) of knee    Obstructive sleep apnea of adult    Abnormal thyroid ultrasound    Cancer (Multi)    Age-related nuclear cataract of both eyes    Degenerative retinal drusen of both eyes    Dry eyes    Familial combined hyperlipidemia    History of elevated lipids    History of hypertension    Nuclear senile cataract of left eye    Ptosis of both eyelids    Pulmonary hypertension (Multi)    Sleep apnea    Viral upper respiratory tract infection     Past Surgical History:   Procedure Laterality Date    HERNIA REPAIR  05/11/2017    Inguinal Hernia Repair    OTHER SURGICAL HISTORY  08/29/2013     Thoracoscopy (Therapeutic) W/ Wedge Resect Of Lung Single    TOTAL ABDOMINAL HYSTERECTOMY  02/07/2018    Total Abdominal Hysterectomy     Current Outpatient Medications on File Prior to Visit   Medication Sig Dispense Refill    apixaban (Eliquis) 5 mg tablet Take 1 tablet (5 mg) by mouth 2 times a day.      atorvastatin (Lipitor) 10 mg tablet Take 1 tablet (10 mg) by mouth. 5 DAYS PER WEEK; SKIP WEDNESDAY AND SUNDAY      benazepril (Lotensin) 40 mg tablet Take 1 tablet (40 mg) by mouth once daily.      cholecalciferol (Vitamin D-3) 50 mcg (2,000 unit) capsule Take by mouth.      furosemide (Lasix) 20 mg tablet TAKE 2 TABLETS BY MOUTH AS DIRECTED 4 DAYS A WEEK      metoprolol succinate XL (Toprol-XL) 50 mg 24 hr tablet Take 1 tablet (50 mg) by mouth once daily.      multivitamin with minerals iron-free (Centrum Silver) Take 1 tablet by mouth once daily.      potassium chloride CR 20 mEq ER tablet Take 1 tablet (20 mEq) by mouth once daily.      psyllium (Metamucil) 3.4 gram packet Take by mouth.       No current facility-administered medications on file prior to visit.       Exam:  General Exam:  Constitutional - NAD, AAO x 3, conversing appropriately.  HEENT- Normocephalic and atraumatic. No facial deformities. Hearing grossly normal.  Lungs - Breathing non-labored with normal rate. No accessory muscle use.  CV - Extremities warm and well-perfused, brisk capillary refill present.   Neuro - CN II-XII grossly intact.    Right Knee examined. Lymphedema of bilateral lower extremity. Otherwise no erythema, effusion or ecchymosis. AROM from 5 to 115 deg with 5/5 strength. SILT overlying knee. Retropatellar crepitus present. TTP along lateral >medial joint line.  No popliteal mass palpated. Varus stress results in laxity but not pain at 0 or 30 degree. No patellar apprehension. Negative Keith.    Results:  Xrays of XR R knee obtained 2/25/2025 personally interpreted as severe lateral compartment and moderate  patellofemoral compartment degenerative changes with joint space narrowing, osteophyte formation, and subchondral sclerosis.     Lab Results   Component Value Date    HGBA1C 5.1 12/14/2018    CREATININE 0.80 08/29/2024    EGFR 73 08/29/2024     Procedure: none

## 2025-03-13 ENCOUNTER — APPOINTMENT (OUTPATIENT)
Dept: PRIMARY CARE | Facility: CLINIC | Age: 85
End: 2025-03-13
Payer: MEDICARE

## 2025-03-13 VITALS
HEART RATE: 62 BPM | WEIGHT: 160 LBS | BODY MASS INDEX: 32.32 KG/M2 | OXYGEN SATURATION: 96 % | RESPIRATION RATE: 16 BRPM

## 2025-03-13 DIAGNOSIS — M17.11 PRIMARY OSTEOARTHRITIS OF RIGHT KNEE: ICD-10-CM

## 2025-03-13 DIAGNOSIS — I10 BENIGN ESSENTIAL HYPERTENSION: Primary | ICD-10-CM

## 2025-03-13 PROCEDURE — 1123F ACP DISCUSS/DSCN MKR DOCD: CPT | Performed by: INTERNAL MEDICINE

## 2025-03-13 PROCEDURE — 1036F TOBACCO NON-USER: CPT | Performed by: INTERNAL MEDICINE

## 2025-03-13 PROCEDURE — 1159F MED LIST DOCD IN RCRD: CPT | Performed by: INTERNAL MEDICINE

## 2025-03-13 PROCEDURE — 99213 OFFICE O/P EST LOW 20 MIN: CPT | Performed by: INTERNAL MEDICINE

## 2025-03-13 ASSESSMENT — ENCOUNTER SYMPTOMS
LOSS OF SENSATION IN FEET: 0
OCCASIONAL FEELINGS OF UNSTEADINESS: 1
DEPRESSION: 0

## 2025-03-13 NOTE — PROGRESS NOTES
Subjective   Patient ID: Jessica Leija is a 84 y.o. female who presents for Follow-up.    HPI   Patient is here for follow-up hypertension.  Her blood pressure is running 120/70 at home.  Review of Systems  Patient saw orthopedic sports medicine for her right knee pain they gave her options she is currently doing exercises and it is feeling a little bit better.  She may have an injection down the road  Objective   Pulse 62   Resp 16   Wt 72.6 kg (160 lb)   SpO2 96%   BMI 32.32 kg/m²     Physical Exam  Today's blood pressure is 160/70 which I can hear better in the left arm.  Her extremities trace edema  Gait she walks with a rollator but doing okay  Assessment/Plan   There are no diagnoses linked to this encounter.  Hypertension I believe her blood pressure is under good control it is higher today but she is doing better at home  Right knee arthritis continue with your exercises if the pain gets too bad get an injection  Follow-up with one of my colleagues in 3 to 4 months

## 2025-03-18 ENCOUNTER — TELEPHONE (OUTPATIENT)
Dept: ORTHOPEDIC SURGERY | Facility: CLINIC | Age: 85
End: 2025-03-18
Payer: MEDICARE

## 2025-03-18 NOTE — TELEPHONE ENCOUNTER
Copied from CRM #[id number]. Topic: Information Request - Doctor, Hospital, or Provider  >> Mar 17, 2025  9:52 AM Shelly GERARD wrote:  Pt is coming for an injection. dT in [facility] would not let me schedule it as an injection. If this is not correct, call pt to reschedule it at listed #. I scheduled first available.

## 2025-03-25 ENCOUNTER — APPOINTMENT (OUTPATIENT)
Dept: ORTHOPEDIC SURGERY | Facility: CLINIC | Age: 85
End: 2025-03-25
Payer: MEDICARE

## 2025-03-25 VITALS — WEIGHT: 160 LBS | HEIGHT: 59 IN | BODY MASS INDEX: 32.25 KG/M2

## 2025-03-25 DIAGNOSIS — M17.11 PRIMARY LOCALIZED OSTEOARTHRITIS OF RIGHT KNEE: Primary | ICD-10-CM

## 2025-03-25 PROCEDURE — 1036F TOBACCO NON-USER: CPT | Performed by: FAMILY MEDICINE

## 2025-03-25 PROCEDURE — 1125F AMNT PAIN NOTED PAIN PRSNT: CPT | Performed by: FAMILY MEDICINE

## 2025-03-25 PROCEDURE — 1159F MED LIST DOCD IN RCRD: CPT | Performed by: FAMILY MEDICINE

## 2025-03-25 PROCEDURE — 1123F ACP DISCUSS/DSCN MKR DOCD: CPT | Performed by: FAMILY MEDICINE

## 2025-03-25 PROCEDURE — 99213 OFFICE O/P EST LOW 20 MIN: CPT | Performed by: FAMILY MEDICINE

## 2025-03-25 PROCEDURE — 20610 DRAIN/INJ JOINT/BURSA W/O US: CPT | Performed by: FAMILY MEDICINE

## 2025-03-25 RX ORDER — TRIAMCINOLONE ACETONIDE 40 MG/ML
40 INJECTION, SUSPENSION INTRA-ARTICULAR; INTRAMUSCULAR
Status: COMPLETED | OUTPATIENT
Start: 2025-03-25 | End: 2025-03-25

## 2025-03-25 RX ORDER — LIDOCAINE HYDROCHLORIDE 10 MG/ML
4 INJECTION, SOLUTION INFILTRATION; PERINEURAL
Status: COMPLETED | OUTPATIENT
Start: 2025-03-25 | End: 2025-03-25

## 2025-03-25 RX ADMIN — TRIAMCINOLONE ACETONIDE 40 MG: 40 INJECTION, SUSPENSION INTRA-ARTICULAR; INTRAMUSCULAR at 10:59

## 2025-03-25 RX ADMIN — LIDOCAINE HYDROCHLORIDE 4 ML: 10 INJECTION, SOLUTION INFILTRATION; PERINEURAL at 10:59

## 2025-03-25 ASSESSMENT — PAIN - FUNCTIONAL ASSESSMENT: PAIN_FUNCTIONAL_ASSESSMENT: 0-10

## 2025-03-25 ASSESSMENT — PAIN DESCRIPTION - DESCRIPTORS: DESCRIPTORS: DISCOMFORT;ACHING;OTHER (COMMENT)

## 2025-03-25 ASSESSMENT — PAIN SCALES - GENERAL: PAINLEVEL_OUTOF10: 4

## 2025-03-25 NOTE — PROGRESS NOTES
** Please excuse any errors in grammar or translation related to this dictation. Voice recognition software was utilized to prepare this document. **    Assessment & Plan:  Patient returns for further management of her advanced right knee arthritis.  While overall symptoms are mild they are not resolving.  - Exercise program to improve quad & hamstring strength to support joint.  Encouraged her to complete home exercise program that was provided previously.  - Activity modifications as needed to include use of cane/walker or braces. She uses a cane.  - Analgesics to include but not limited to Tylenol up to 3g daily; topical diclofenac gel (Voltaren) up to 4x/daily.  Discussed optimization of diclofenac gel and tylenol. She is on eliquis so avoid oral NSAIDs.   - Steroid injection.  Reassured patient that needle using injection will be minimally traumatic and is unlikely to cause bleeding to within her knee joint.  Likely have a small bruise at insertion site.  With this information patient elected to receive intra-articular CSI today.  - Referral to arthroplastic surgeon for potential joint replacement.  Patient possible considering this option. Given the advanced nature of her disease I encouraged her to speak with a joint replacement surgeon about candidacy and provided her with the contact of ration of her Dr. Sena.    Informed patient that steroid injection can be repeated in 3 or more months as symptoms dictate.  All questions answered and patient is agreeable to this plan.     Chief complaint:  Right knee pain    HPI:  3/25/25: Patient following up for right knee pain.  No change in condition since last visit 4 weeks ago.  Continues to have daily anterior knee pain.  Overall feels symptoms are mild however at times can acutely worsen.  Here today to discuss further treatment for her condition.  She was concerned about injection given her Eliquis use.    2/25/25: 85 y/o femlae, hx of lymphedema and  paroxysmal afib on eliquis, presents with right anterior knee pain.  This complaint has been ongoing for a year.  No mechanism of injury reported at onset. Symptoms have progressively worsened with time.  Pain is most prominent at anterior knee though it is also present into the lower leg at times.  It is associated with crackling/popping sensation. Endorses gives out but no locking. Symptoms are aggravated by walking. She does not do stairs, kneel, squat. Treatment to date includes cane. She takes Eliquis so she is unable to take nsaids.  Denies previous surgery to this site.     Patient Active Problem List   Diagnosis    Abnormal MRI    Abscess of right shoulder    Acquired claw toe    Acquired hallux valgus of left foot    Acquired hallux varus of right foot    Age-related macular degeneration    Benign essential hypertension    Benign paroxysmal positional vertigo    Callus    Chronic bilateral low back pain without sciatica    Chronic toe pain, left foot    Drainage from wound    Edema    Hammer toe of left foot    Hyperlipidemia    Hypertension    IFG (impaired fasting glucose)    Irregular heartbeat    Nontoxic multinodular goiter    Paroxysmal atrial fibrillation (Multi)    Rectal bleeding    Sciatica of right side    Sebaceous cyst    Solitary thyroid nodule    Spinal stenosis of lumbar region with neurogenic claudication    Vitamin D deficiency    Ataxia    Bruit of right carotid artery    Class 1 obesity with body mass index (BMI) of 33.0 to 33.9 in adult    Congestive heart failure (CHF)    History of cancer of uterus    Heart murmur    Mild anemia    OA (osteoarthritis) of knee    Obstructive sleep apnea of adult    Abnormal thyroid ultrasound    Cancer (Multi)    Age-related nuclear cataract of both eyes    Degenerative retinal drusen of both eyes    Dry eyes    Familial combined hyperlipidemia    History of elevated lipids    History of hypertension    Nuclear senile cataract of left eye    Ptosis of  both eyelids    Pulmonary hypertension (Multi)    Sleep apnea    Viral upper respiratory tract infection     Past Surgical History:   Procedure Laterality Date    HERNIA REPAIR  05/11/2017    Inguinal Hernia Repair    OTHER SURGICAL HISTORY  08/29/2013    Thoracoscopy (Therapeutic) W/ Wedge Resect Of Lung Single    TOTAL ABDOMINAL HYSTERECTOMY  02/07/2018    Total Abdominal Hysterectomy     Current Outpatient Medications on File Prior to Visit   Medication Sig Dispense Refill    apixaban (Eliquis) 5 mg tablet Take 1 tablet (5 mg) by mouth 2 times a day.      atorvastatin (Lipitor) 10 mg tablet Take 1 tablet (10 mg) by mouth. 5 DAYS PER WEEK; SKIP WEDNESDAY AND SUNDAY      benazepril (Lotensin) 40 mg tablet Take 1 tablet (40 mg) by mouth once daily.      cholecalciferol (Vitamin D-3) 50 mcg (2,000 unit) capsule Take by mouth.      furosemide (Lasix) 20 mg tablet TAKE 2 TABLETS BY MOUTH AS DIRECTED 4 DAYS A WEEK      metoprolol succinate XL (Toprol-XL) 50 mg 24 hr tablet Take 1 tablet (50 mg) by mouth once daily.      multivitamin with minerals iron-free (Centrum Silver) Take 1 tablet by mouth once daily.      potassium chloride CR 20 mEq ER tablet Take 1 tablet (20 mEq) by mouth once daily.      psyllium (Metamucil) 3.4 gram packet Take by mouth.       No current facility-administered medications on file prior to visit.       Exam:  General Exam:  Constitutional - NAD, AAO x 3, conversing appropriately.  HEENT- Normocephalic and atraumatic. No facial deformities. Hearing grossly normal.  Lungs - Breathing non-labored with normal rate. No accessory muscle use.  CV - Extremities warm and well-perfused, brisk capillary refill present.   Neuro - CN II-XII grossly intact.    Right Knee examined. Lymphedema of bilateral lower extremity. Otherwise no erythema, effusion or ecchymosis. AROM from 5 to 115 deg with 5/5 strength. SILT overlying knee. Retropatellar crepitus present. TTP along lateral >medial joint line.  No  popliteal mass palpated. Varus stress results in laxity but not pain at 0 or 30 degree. No patellar apprehension. Negative Keith.    Results:  Xrays of XR R knee obtained 2/25/2025 personally interpreted as severe lateral compartment and moderate patellofemoral compartment degenerative changes with joint space narrowing, osteophyte formation, and subchondral sclerosis.     Lab Results   Component Value Date    HGBA1C 5.1 12/14/2018    CREATININE 0.80 08/29/2024    EGFR 73 08/29/2024     Procedure:  Patient ID: Jessica Leija is a 84 y.o. female.    L Inj/Asp: R knee on 3/25/2025 10:59 AM  Indications: pain  Details: 25 G needle, anterolateral approach  Medications: 40 mg triamcinolone acetonide 40 mg/mL; 4 mL lidocaine 10 mg/mL (1 %)  Outcome: tolerated well, no immediate complications    Procedure risk factors to include increased pain, bleeding, infection, neurovascular injury, soft tissue injury, progressive cartilage loss, transient elevation of blood glucose and blood pressure, and adverse reaction to medication were discussed with the patient. Patient understands there is a moderate risk of morbidity from undergoing the procedure.    Procedure, treatment alternatives, risks and benefits explained, specific risks discussed. Consent was given by the patient. Immediately prior to procedure a time out was called to verify the correct patient, procedure, equipment, support staff and site/side marked as required. Patient was prepped and draped in the usual sterile fashion.

## 2025-07-21 ENCOUNTER — OFFICE VISIT (OUTPATIENT)
Dept: ORTHOPEDIC SURGERY | Facility: CLINIC | Age: 85
End: 2025-07-21
Payer: MEDICARE

## 2025-07-21 ENCOUNTER — HOSPITAL ENCOUNTER (OUTPATIENT)
Dept: RADIOLOGY | Facility: EXTERNAL LOCATION | Age: 85
Discharge: HOME | End: 2025-07-21

## 2025-07-21 DIAGNOSIS — M17.11 PRIMARY LOCALIZED OSTEOARTHRITIS OF RIGHT KNEE: Primary | ICD-10-CM

## 2025-07-21 PROCEDURE — 1159F MED LIST DOCD IN RCRD: CPT | Performed by: FAMILY MEDICINE

## 2025-07-21 PROCEDURE — 20611 DRAIN/INJ JOINT/BURSA W/US: CPT | Mod: RT | Performed by: FAMILY MEDICINE

## 2025-07-21 PROCEDURE — 1036F TOBACCO NON-USER: CPT | Performed by: FAMILY MEDICINE

## 2025-07-21 PROCEDURE — 99212 OFFICE O/P EST SF 10 MIN: CPT | Mod: 25 | Performed by: FAMILY MEDICINE

## 2025-07-21 PROCEDURE — 99214 OFFICE O/P EST MOD 30 MIN: CPT | Performed by: FAMILY MEDICINE

## 2025-07-21 PROCEDURE — 2500000004 HC RX 250 GENERAL PHARMACY W/ HCPCS (ALT 636 FOR OP/ED): Mod: JZ | Performed by: FAMILY MEDICINE

## 2025-07-21 RX ADMIN — TRIAMCINOLONE ACETONIDE EXTENDED-RELEASE INJECTABLE SUSPENSION 32 MG: KIT INTRA-ARTICULAR at 09:48

## 2025-07-21 ASSESSMENT — PAIN SCALES - GENERAL: PAINLEVEL_OUTOF10: 5 - MODERATE PAIN

## 2025-07-21 ASSESSMENT — PAIN - FUNCTIONAL ASSESSMENT: PAIN_FUNCTIONAL_ASSESSMENT: 0-10

## 2025-07-21 NOTE — PROGRESS NOTES
** Please excuse any errors in grammar or translation related to this dictation. Voice recognition software was utilized to prepare this document. **    Assessment & Plan:  Patient here for advanced right knee arthritis management.  Since last visit patient met with arthroplastic surgeon who did not recommend knee replacement given medical complexity and patient agreed with this recommendation.  CSI completed in March helped but only for 1 month.  Nonoperative measures reviewed below.  - Exercise program to improve quad & hamstring strength to support joint.  HEP provided previously.  - Activity modifications as needed to include use of cane/walker or braces. She uses a cane.  Patient should consider lateral  brace particularly if not undergoing surgical invention; she declines today.  - Analgesics to include but not limited to Tylenol up to 3g daily; topical diclofenac gel (Voltaren) up to 4x/daily.  Discussed optimization of diclofenac gel and tylenol. She is on eliquis so avoid oral NSAIDs.   - Steroid injection.  Zilretta injection completed today.  Can repeat in 3 more months as symptoms dictate.  -Hyaluronic acid injection.  Can attempt based on response to Zilretta.  -Patient could also seek out second opinion in regards to surgery but this time seems content on nonoperative management only.   All questions answered and patient is agreeable to this plan.     Chief complaint:  Right knee pain    HPI:  7/21/25:  Patient follows up for right knee pain.  She reports the injection from 3/25 provided about 1 month relief.  Since last visit patient had met with arthroplastic surgeon Dr. Simon at [facility] (note 6/11/25 reviewed) who recommended that given patient's age and medical complexity to not undergo joint placement surgery.     3/25/25: Patient following up for right knee pain.  No change in condition since last visit 4 weeks ago.  Continues to have daily anterior knee pain.  Overall feels  symptoms are mild however at times can acutely worsen.  Here today to discuss further treatment for her condition.  She was concerned about injection given her Eliquis use.    2/25/25: 83 y/o jae, hx of lymphedema and paroxysmal afib on eliquis, presents with right anterior knee pain.  This complaint has been ongoing for a year.  No mechanism of injury reported at onset. Symptoms have progressively worsened with time.  Pain is most prominent at anterior knee though it is also present into the lower leg at times.  It is associated with crackling/popping sensation. Endorses gives out but no locking. Symptoms are aggravated by walking. She does not do stairs, kneel, squat. Treatment to date includes cane. She takes Eliquis so she is unable to take nsaids.  Denies previous surgery to this site.     Patient Active Problem List   Diagnosis    Abnormal MRI    Abscess of right shoulder    Acquired claw toe    Acquired hallux valgus of left foot    Acquired hallux varus of right foot    Age-related macular degeneration    Benign essential hypertension    Benign paroxysmal positional vertigo    Callus    Chronic bilateral low back pain without sciatica    Chronic toe pain, left foot    Drainage from wound    Edema    Hammer toe of left foot    Hyperlipidemia    Hypertension    IFG (impaired fasting glucose)    Irregular heartbeat    Nontoxic multinodular goiter    Paroxysmal atrial fibrillation (Multi)    Rectal bleeding    Sciatica of right side    Sebaceous cyst    Solitary thyroid nodule    Spinal stenosis of lumbar region with neurogenic claudication    Vitamin D deficiency    Ataxia    Bruit of right carotid artery    Class 1 obesity with body mass index (BMI) of 33.0 to 33.9 in adult    Congestive heart failure (CHF)    History of cancer of uterus    Heart murmur    Mild anemia    OA (osteoarthritis) of knee    Obstructive sleep apnea of adult    Abnormal thyroid ultrasound    Cancer (Multi)    Age-related nuclear  cataract of both eyes    Degenerative retinal drusen of both eyes    Dry eyes    Familial combined hyperlipidemia    History of elevated lipids    History of hypertension    Nuclear senile cataract of left eye    Ptosis of both eyelids    Pulmonary hypertension (Multi)    Sleep apnea    Viral upper respiratory tract infection     Past Surgical History:   Procedure Laterality Date    HERNIA REPAIR  05/11/2017    Inguinal Hernia Repair    OTHER SURGICAL HISTORY  08/29/2013    Thoracoscopy (Therapeutic) W/ Wedge Resect Of Lung Single    TOTAL ABDOMINAL HYSTERECTOMY  02/07/2018    Total Abdominal Hysterectomy     Current Outpatient Medications on File Prior to Visit   Medication Sig Dispense Refill    apixaban (Eliquis) 5 mg tablet Take 1 tablet (5 mg) by mouth 2 times a day.      atorvastatin (Lipitor) 10 mg tablet Take 1 tablet (10 mg) by mouth. 5 DAYS PER WEEK; SKIP WEDNESDAY AND SUNDAY      benazepril (Lotensin) 40 mg tablet Take 1 tablet (40 mg) by mouth once daily.      cholecalciferol (Vitamin D-3) 50 mcg (2,000 unit) capsule Take by mouth.      furosemide (Lasix) 20 mg tablet TAKE 2 TABLETS BY MOUTH AS DIRECTED 4 DAYS A WEEK      metoprolol succinate XL (Toprol-XL) 50 mg 24 hr tablet Take 1 tablet (50 mg) by mouth once daily.      multivitamin with minerals iron-free (Centrum Silver) Take 1 tablet by mouth once daily.      potassium chloride CR 20 mEq ER tablet Take 1 tablet (20 mEq) by mouth once daily.      psyllium (Metamucil) 3.4 gram packet Take by mouth.       No current facility-administered medications on file prior to visit.       Exam:  General Exam:  Constitutional - NAD, AAO x 3, conversing appropriately.  HEENT- Normocephalic and atraumatic. No facial deformities. Hearing grossly normal.  Lungs - Breathing non-labored with normal rate. No accessory muscle use.  CV - Extremities warm and well-perfused, brisk capillary refill present.   Neuro - CN II-XII grossly intact.    Right Knee examined.  Lymphedema of bilateral lower extremity.  Small effusion.  No erythema, effusion or ecchymosis. AROM from 5 to 115 deg with 5/5 strength. SILT overlying knee. Retropatellar crepitus present. TTP along lateral and medial joint line.  No popliteal mass palpated. Valgus deformity partially correctable with varus stress. No patellar apprehension. Negative Keith.    Results:  Xrays of XR R knee obtained 2/25/2025: severe lateral compartment and moderate patellofemoral compartment degenerative changes with joint space narrowing, osteophyte formation, and subchondral sclerosis. Valgus alignment.     Lab Results   Component Value Date    HGBA1C 5.1 12/14/2018    CREATININE 0.80 08/29/2024    EGFR 73 08/29/2024     Procedure:  Patient ID: Jessica Leija is a 85 y.o. female.    L Inj/Asp: R knee on 7/21/2025 9:48 AM  Indications: pain  Details: 25 G needle, ultrasound-guided superolateral approach  Medications: 32 mg triamcinolone acetonide 32 mg  Outcome: tolerated well, no immediate complications    Procedure risk factors to include increased pain, bleeding, infection, neurovascular injury, soft tissue injury, progressive cartilage loss, transient elevation of blood glucose and blood pressure, and adverse reaction to medication were discussed with the patient. Patient understands there is a moderate risk of morbidity from undergoing the procedure.  Procedure, treatment alternatives, risks and benefits explained, specific risks discussed. Consent was given by the patient. Immediately prior to procedure a time out was called to verify the correct patient, procedure, equipment, support staff and site/side marked as required. Patient was prepped and draped in the usual sterile fashion.

## 2025-10-03 ENCOUNTER — APPOINTMENT (OUTPATIENT)
Dept: PRIMARY CARE | Facility: CLINIC | Age: 85
End: 2025-10-03
Payer: MEDICARE